# Patient Record
Sex: FEMALE | Race: BLACK OR AFRICAN AMERICAN | Employment: OTHER | URBAN - METROPOLITAN AREA
[De-identification: names, ages, dates, MRNs, and addresses within clinical notes are randomized per-mention and may not be internally consistent; named-entity substitution may affect disease eponyms.]

---

## 2018-10-02 ENCOUNTER — HOSPITAL ENCOUNTER (EMERGENCY)
Age: 83
Discharge: ANOTHER HOSPITAL | End: 2018-10-02
Attending: EMERGENCY MEDICINE
Payer: MEDICARE

## 2018-10-02 ENCOUNTER — APPOINTMENT (OUTPATIENT)
Dept: CT IMAGING | Age: 83
End: 2018-10-02
Payer: MEDICARE

## 2018-10-02 ENCOUNTER — APPOINTMENT (OUTPATIENT)
Dept: GENERAL RADIOLOGY | Age: 83
End: 2018-10-02
Payer: MEDICARE

## 2018-10-02 VITALS
HEIGHT: 60 IN | WEIGHT: 134.7 LBS | BODY MASS INDEX: 26.45 KG/M2 | SYSTOLIC BLOOD PRESSURE: 121 MMHG | TEMPERATURE: 97.5 F | HEART RATE: 70 BPM | OXYGEN SATURATION: 98 % | DIASTOLIC BLOOD PRESSURE: 60 MMHG | RESPIRATION RATE: 14 BRPM

## 2018-10-02 DIAGNOSIS — K85.90 ACUTE PANCREATITIS WITHOUT INFECTION OR NECROSIS, UNSPECIFIED PANCREATITIS TYPE: Primary | ICD-10-CM

## 2018-10-02 DIAGNOSIS — R55 SYNCOPE AND COLLAPSE: ICD-10-CM

## 2018-10-02 LAB
ALBUMIN SERPL-MCNC: 3.9 G/DL (ref 3.4–5)
ALP BLD-CCNC: 121 U/L (ref 40–129)
ALT SERPL-CCNC: 10 U/L (ref 10–40)
ANION GAP SERPL CALCULATED.3IONS-SCNC: 14 MMOL/L (ref 3–16)
AST SERPL-CCNC: 19 U/L (ref 15–37)
BASOPHILS ABSOLUTE: 0 K/UL (ref 0–0.2)
BASOPHILS RELATIVE PERCENT: 0.3 %
BILIRUB SERPL-MCNC: 0.4 MG/DL (ref 0–1)
BILIRUBIN DIRECT: <0.2 MG/DL (ref 0–0.3)
BILIRUBIN, INDIRECT: NORMAL MG/DL (ref 0–1)
BUN BLDV-MCNC: 35 MG/DL (ref 7–20)
CALCIUM SERPL-MCNC: 8.2 MG/DL (ref 8.3–10.6)
CHLORIDE BLD-SCNC: 96 MMOL/L (ref 99–110)
CO2: 30 MMOL/L (ref 21–32)
CREAT SERPL-MCNC: 3.6 MG/DL (ref 0.6–1.2)
EOSINOPHILS ABSOLUTE: 0 K/UL (ref 0–0.6)
EOSINOPHILS RELATIVE PERCENT: 0.5 %
GFR AFRICAN AMERICAN: 15
GFR NON-AFRICAN AMERICAN: 12
GLUCOSE BLD-MCNC: 78 MG/DL (ref 70–99)
HCT VFR BLD CALC: 38.1 % (ref 36–48)
HEMOGLOBIN: 11.5 G/DL (ref 12–16)
LIPASE: 523 U/L (ref 13–60)
LYMPHOCYTES ABSOLUTE: 1.1 K/UL (ref 1–5.1)
LYMPHOCYTES RELATIVE PERCENT: 17.4 %
MCH RBC QN AUTO: 27.4 PG (ref 26–34)
MCHC RBC AUTO-ENTMCNC: 30.2 G/DL (ref 31–36)
MCV RBC AUTO: 90.8 FL (ref 80–100)
MONOCYTES ABSOLUTE: 1.1 K/UL (ref 0–1.3)
MONOCYTES RELATIVE PERCENT: 16.5 %
NEUTROPHILS ABSOLUTE: 4.2 K/UL (ref 1.7–7.7)
NEUTROPHILS RELATIVE PERCENT: 65.3 %
PDW BLD-RTO: 20.5 % (ref 12.4–15.4)
PLATELET # BLD: 129 K/UL (ref 135–450)
PMV BLD AUTO: 7.7 FL (ref 5–10.5)
POTASSIUM SERPL-SCNC: 3.7 MMOL/L (ref 3.5–5.1)
RBC # BLD: 4.2 M/UL (ref 4–5.2)
SODIUM BLD-SCNC: 140 MMOL/L (ref 136–145)
TOTAL PROTEIN: 6.8 G/DL (ref 6.4–8.2)
TROPONIN: 0.1 NG/ML
WBC # BLD: 6.4 K/UL (ref 4–11)

## 2018-10-02 PROCEDURE — 83690 ASSAY OF LIPASE: CPT

## 2018-10-02 PROCEDURE — 84484 ASSAY OF TROPONIN QUANT: CPT

## 2018-10-02 PROCEDURE — 96374 THER/PROPH/DIAG INJ IV PUSH: CPT

## 2018-10-02 PROCEDURE — 93005 ELECTROCARDIOGRAM TRACING: CPT | Performed by: EMERGENCY MEDICINE

## 2018-10-02 PROCEDURE — 85025 COMPLETE CBC W/AUTO DIFF WBC: CPT

## 2018-10-02 PROCEDURE — 96361 HYDRATE IV INFUSION ADD-ON: CPT

## 2018-10-02 PROCEDURE — 71045 X-RAY EXAM CHEST 1 VIEW: CPT

## 2018-10-02 PROCEDURE — 74176 CT ABD & PELVIS W/O CONTRAST: CPT

## 2018-10-02 PROCEDURE — 6360000002 HC RX W HCPCS: Performed by: EMERGENCY MEDICINE

## 2018-10-02 PROCEDURE — 80076 HEPATIC FUNCTION PANEL: CPT

## 2018-10-02 PROCEDURE — 80048 BASIC METABOLIC PNL TOTAL CA: CPT

## 2018-10-02 PROCEDURE — 99285 EMERGENCY DEPT VISIT HI MDM: CPT

## 2018-10-02 PROCEDURE — 2580000003 HC RX 258: Performed by: EMERGENCY MEDICINE

## 2018-10-02 RX ORDER — MELOXICAM 7.5 MG/1
7.5 TABLET ORAL DAILY
COMMUNITY

## 2018-10-02 RX ORDER — DRONABINOL 5 MG/1
5 CAPSULE ORAL
COMMUNITY

## 2018-10-02 RX ORDER — ONDANSETRON 2 MG/ML
4 INJECTION INTRAMUSCULAR; INTRAVENOUS EVERY 30 MIN PRN
Status: DISCONTINUED | OUTPATIENT
Start: 2018-10-02 | End: 2018-10-02 | Stop reason: HOSPADM

## 2018-10-02 RX ORDER — FLUDROCORTISONE ACETATE 0.1 MG/1
0.1 TABLET ORAL 2 TIMES DAILY
COMMUNITY

## 2018-10-02 RX ORDER — 0.9 % SODIUM CHLORIDE 0.9 %
500 INTRAVENOUS SOLUTION INTRAVENOUS ONCE
Status: COMPLETED | OUTPATIENT
Start: 2018-10-02 | End: 2018-10-02

## 2018-10-02 RX ORDER — DONEPEZIL HYDROCHLORIDE 10 MG/1
10 TABLET, FILM COATED ORAL NIGHTLY
COMMUNITY

## 2018-10-02 RX ORDER — PREDNISONE 20 MG/1
80 TABLET ORAL DAILY
COMMUNITY
Start: 2018-10-01 | End: 2018-10-02

## 2018-10-02 RX ORDER — POTASSIUM CHLORIDE 20 MEQ/1
20 TABLET, EXTENDED RELEASE ORAL
COMMUNITY

## 2018-10-02 RX ORDER — LOPERAMIDE HYDROCHLORIDE 2 MG/1
2 CAPSULE ORAL 3 TIMES DAILY
COMMUNITY

## 2018-10-02 RX ADMIN — SODIUM CHLORIDE 500 ML: 9 INJECTION, SOLUTION INTRAVENOUS at 15:06

## 2018-10-02 RX ADMIN — HYDROCORTISONE SODIUM SUCCINATE 100 MG: 100 INJECTION, POWDER, FOR SOLUTION INTRAMUSCULAR; INTRAVENOUS at 15:14

## 2018-10-02 NOTE — ED PROVIDER NOTES
[address] [facility] RESIDENT NOTE       Date of evaluation: 10/2/2018    Chief Complaint     Loss of Consciousness (syncopal episode while in dialysis) and Dizziness      History of Present Illness     Tabby Moreno is a 82 y.o. female who With past medical history of end-stage renal disease on dialysis, hypertension, diabetes and other medical problems as outlined below presents to the emergency department from dialysis with syncopal episode. Patient was on the third of 4 hours of her dialysis treatment when she went to go to the restroom. Upon returning to her seat she sat down in the chair and shortly thereafter began feeling lightheaded. She syncopized while in the chair, but did not fall and did not incur any injuries. Prior to this syncopal episode she did not experience any palpitations, chest pain, dyspnea, or other specific symptoms. She does endorse diffuse abdominal pain and chronic diarrhea that is at baseline. She does not make urine and so does not complain of dysuria or hematuria. She has had no bloody stools or melena. She also denies fevers. Review of Systems     Review of Systems    10-point ROS is otherwise negative    Past Medical, Surgical, Family, and Social History     She has a past medical history of Abscess; Abscess; Anemia; Back pain; Chronic kidney disease; Clostridium difficile infection; COPD (chronic obstructive pulmonary disease) ([address] Dementia; Depression; DM2 (diabetes mellitus, type 2) ([address] ESRD (end stage renal disease) ([address] Hypertension; Hypothyroidism; Obesity hypoventilation syndrome ([address] MELISSA (obstructive sleep apnea); Other activity(E029.9); Oxygen dependent; Parkinsonism ([address] Pseudotumor cerebri; Restrictive lung disease; and Spinal stenosis. She has a past surgical history that includes Hysterectomy; Gastric bypass surgery; Cholecystectomy; and hernia repair.   Her family history includes Diabetes in an

## 2018-10-02 NOTE — ED TRIAGE NOTES
Pt arrived to ED via squad for syncopal episode during dialysis today. Received 3/4 hours of treatment. Pt still feeling lightheaded. Denies chest pain. Also complaining of upset stomach.

## 2018-10-02 NOTE — ED NOTES
Pt resting in bed. Pt and family updated on POC. Call light in reach will continue to monitor.       Mukesh Seaman RN  10/02/18 9117

## 2018-10-17 LAB
EKG ATRIAL RATE: 69 BPM
EKG DIAGNOSIS: NORMAL
EKG P AXIS: 39 DEGREES
EKG P-R INTERVAL: 172 MS
EKG Q-T INTERVAL: 434 MS
EKG QRS DURATION: 86 MS
EKG QTC CALCULATION (BAZETT): 465 MS
EKG R AXIS: 1 DEGREES
EKG T AXIS: 4 DEGREES
EKG VENTRICULAR RATE: 69 BPM

## 2020-11-19 ENCOUNTER — APPOINTMENT (OUTPATIENT)
Dept: CT IMAGING | Age: 85
DRG: 189 | End: 2020-11-19
Payer: MEDICARE

## 2020-11-19 ENCOUNTER — APPOINTMENT (OUTPATIENT)
Dept: GENERAL RADIOLOGY | Age: 85
DRG: 189 | End: 2020-11-19
Payer: MEDICARE

## 2020-11-19 ENCOUNTER — HOSPITAL ENCOUNTER (INPATIENT)
Age: 85
LOS: 5 days | Discharge: NURSING, REHAB OR LONG-TERM CARE | DRG: 189 | End: 2020-11-24
Attending: EMERGENCY MEDICINE | Admitting: INTERNAL MEDICINE
Payer: MEDICARE

## 2020-11-19 PROBLEM — J44.1 COPD EXACERBATION (HCC): Status: ACTIVE | Noted: 2020-11-19

## 2020-11-19 LAB
ALBUMIN SERPL-MCNC: 3.8 G/DL (ref 3.4–5)
ALP BLD-CCNC: 109 U/L (ref 40–129)
ALT SERPL-CCNC: 9 U/L (ref 10–40)
ANION GAP SERPL CALCULATED.3IONS-SCNC: 18 MMOL/L (ref 3–16)
AST SERPL-CCNC: 19 U/L (ref 15–37)
BASE EXCESS ARTERIAL: -11.9 MMOL/L (ref -3–3)
BASE EXCESS ARTERIAL: -13 MMOL/L (ref -3–3)
BASE EXCESS VENOUS: -12.2 MMOL/L (ref -2–3)
BASOPHILS ABSOLUTE: 0 K/UL (ref 0–0.2)
BASOPHILS RELATIVE PERCENT: 0.3 %
BILIRUB SERPL-MCNC: 0.4 MG/DL (ref 0–1)
BILIRUBIN DIRECT: <0.2 MG/DL (ref 0–0.3)
BILIRUBIN, INDIRECT: ABNORMAL MG/DL (ref 0–1)
BUN BLDV-MCNC: 84 MG/DL (ref 7–20)
CALCIUM SERPL-MCNC: 7.7 MG/DL (ref 8.3–10.6)
CARBOXYHEMOGLOBIN ARTERIAL: 0 % (ref 0–1.5)
CARBOXYHEMOGLOBIN ARTERIAL: 0.8 % (ref 0–1.5)
CARBOXYHEMOGLOBIN: 0.1 % (ref 0–1.5)
CHLORIDE BLD-SCNC: 101 MMOL/L (ref 99–110)
CO2: 17 MMOL/L (ref 21–32)
CREAT SERPL-MCNC: 7.6 MG/DL (ref 0.6–1.2)
EKG ATRIAL RATE: 70 BPM
EKG DIAGNOSIS: NORMAL
EKG P AXIS: 52 DEGREES
EKG P-R INTERVAL: 164 MS
EKG Q-T INTERVAL: 438 MS
EKG QRS DURATION: 98 MS
EKG QTC CALCULATION (BAZETT): 473 MS
EKG R AXIS: 17 DEGREES
EKG T AXIS: 32 DEGREES
EKG VENTRICULAR RATE: 70 BPM
EOSINOPHILS ABSOLUTE: 0 K/UL (ref 0–0.6)
EOSINOPHILS RELATIVE PERCENT: 0.2 %
GFR AFRICAN AMERICAN: 6
GFR NON-AFRICAN AMERICAN: 5
GLUCOSE BLD-MCNC: 86 MG/DL (ref 70–99)
GLUCOSE BLD-MCNC: 97 MG/DL (ref 70–99)
HCO3 ARTERIAL: 18 MMOL/L (ref 21–29)
HCO3 ARTERIAL: 21 MMOL/L (ref 21–29)
HCO3 VENOUS: 19.3 MMOL/L (ref 24–28)
HCT VFR BLD CALC: 39.6 % (ref 36–48)
HEMOGLOBIN, ART, EXTENDED: 11.1 G/DL
HEMOGLOBIN, ART, EXTENDED: 11.4 G/DL
HEMOGLOBIN: 11.3 G/DL (ref 12–16)
LACTIC ACID: 0.9 MMOL/L (ref 0.4–2)
LYMPHOCYTES ABSOLUTE: 1.9 K/UL (ref 1–5.1)
LYMPHOCYTES RELATIVE PERCENT: 15.5 %
MAGNESIUM: 1.7 MG/DL (ref 1.8–2.4)
MCH RBC QN AUTO: 29.6 PG (ref 26–34)
MCHC RBC AUTO-ENTMCNC: 28.6 G/DL (ref 31–36)
MCV RBC AUTO: 103.2 FL (ref 80–100)
METHEMOGLOBIN ARTERIAL: 3.1 % (ref 0–1.4)
METHEMOGLOBIN ARTERIAL: 3.6 % (ref 0–1.4)
METHEMOGLOBIN VENOUS: 2.6 % (ref 0–1.5)
MONOCYTES ABSOLUTE: 1.5 K/UL (ref 0–1.3)
MONOCYTES RELATIVE PERCENT: 12.1 %
NEUTROPHILS ABSOLUTE: 8.7 K/UL (ref 1.7–7.7)
NEUTROPHILS RELATIVE PERCENT: 71.9 %
O2 SAT, ARTERIAL: 95 % (ref 93–100)
O2 SAT, ARTERIAL: 96 % (ref 93–100)
O2 SAT, VEN: 77 %
PCO2 ARTERIAL: 71.2 MMHG (ref 35–45)
PCO2 ARTERIAL: 87.5 MMHG (ref 35–45)
PCO2, VEN: 73 MMHG (ref 41–51)
PDW BLD-RTO: 17.2 % (ref 12.4–15.4)
PERFORMED ON: NORMAL
PH ARTERIAL: 6.98 (ref 7.35–7.45)
PH ARTERIAL: 7.04 (ref 7.35–7.45)
PH VENOUS: 7.03 (ref 7.35–7.45)
PHOSPHORUS: 8.3 MG/DL (ref 2.5–4.9)
PLATELET # BLD: 89 K/UL (ref 135–450)
PLATELET SLIDE REVIEW: ABNORMAL
PMV BLD AUTO: 9.5 FL (ref 5–10.5)
PO2 ARTERIAL: 165 MMHG (ref 75–108)
PO2 ARTERIAL: 207 MMHG (ref 75–108)
PO2, VEN: 62.5 MMHG (ref 25–40)
POTASSIUM SERPL-SCNC: 5 MMOL/L (ref 3.5–5.1)
RBC # BLD: 3.83 M/UL (ref 4–5.2)
SLIDE REVIEW: ABNORMAL
SODIUM BLD-SCNC: 136 MMOL/L (ref 136–145)
TCO2 ARTERIAL: 21 MMOL/L
TCO2 ARTERIAL: 88 MMOL/L
TCO2 CALC VENOUS: 73 MMOL/L
TOTAL PROTEIN: 6.7 G/DL (ref 6.4–8.2)
TROPONIN: 0.08 NG/ML
WBC # BLD: 12.1 K/UL (ref 4–11)

## 2020-11-19 PROCEDURE — 94761 N-INVAS EAR/PLS OXIMETRY MLT: CPT

## 2020-11-19 PROCEDURE — U0003 INFECTIOUS AGENT DETECTION BY NUCLEIC ACID (DNA OR RNA); SEVERE ACUTE RESPIRATORY SYNDROME CORONAVIRUS 2 (SARS-COV-2) (CORONAVIRUS DISEASE [COVID-19]), AMPLIFIED PROBE TECHNIQUE, MAKING USE OF HIGH THROUGHPUT TECHNOLOGIES AS DESCRIBED BY CMS-2020-01-R: HCPCS

## 2020-11-19 PROCEDURE — 71045 X-RAY EXAM CHEST 1 VIEW: CPT

## 2020-11-19 PROCEDURE — 80076 HEPATIC FUNCTION PANEL: CPT

## 2020-11-19 PROCEDURE — 93005 ELECTROCARDIOGRAM TRACING: CPT | Performed by: EMERGENCY MEDICINE

## 2020-11-19 PROCEDURE — 6360000002 HC RX W HCPCS: Performed by: EMERGENCY MEDICINE

## 2020-11-19 PROCEDURE — 37799 UNLISTED PX VASCULAR SURGERY: CPT

## 2020-11-19 PROCEDURE — 94660 CPAP INITIATION&MGMT: CPT

## 2020-11-19 PROCEDURE — 83605 ASSAY OF LACTIC ACID: CPT

## 2020-11-19 PROCEDURE — 70450 CT HEAD/BRAIN W/O DYE: CPT

## 2020-11-19 PROCEDURE — 84484 ASSAY OF TROPONIN QUANT: CPT

## 2020-11-19 PROCEDURE — 2580000003 HC RX 258: Performed by: EMERGENCY MEDICINE

## 2020-11-19 PROCEDURE — 2000000000 HC ICU R&B

## 2020-11-19 PROCEDURE — 82803 BLOOD GASES ANY COMBINATION: CPT

## 2020-11-19 PROCEDURE — 36600 WITHDRAWAL OF ARTERIAL BLOOD: CPT

## 2020-11-19 PROCEDURE — 99284 EMERGENCY DEPT VISIT MOD MDM: CPT

## 2020-11-19 PROCEDURE — 84100 ASSAY OF PHOSPHORUS: CPT

## 2020-11-19 PROCEDURE — 36415 COLL VENOUS BLD VENIPUNCTURE: CPT

## 2020-11-19 PROCEDURE — 80048 BASIC METABOLIC PNL TOTAL CA: CPT

## 2020-11-19 PROCEDURE — 2700000000 HC OXYGEN THERAPY PER DAY

## 2020-11-19 PROCEDURE — 87040 BLOOD CULTURE FOR BACTERIA: CPT

## 2020-11-19 PROCEDURE — 83735 ASSAY OF MAGNESIUM: CPT

## 2020-11-19 PROCEDURE — 85025 COMPLETE CBC W/AUTO DIFF WBC: CPT

## 2020-11-19 RX ORDER — 0.9 % SODIUM CHLORIDE 0.9 %
500 INTRAVENOUS SOLUTION INTRAVENOUS ONCE
Status: COMPLETED | OUTPATIENT
Start: 2020-11-19 | End: 2020-11-19

## 2020-11-19 RX ORDER — MELOXICAM 7.5 MG/1
7.5 TABLET ORAL DAILY
Status: CANCELLED | OUTPATIENT
Start: 2020-11-19

## 2020-11-19 RX ADMIN — VANCOMYCIN HYDROCHLORIDE 1250 MG: 10 INJECTION, POWDER, LYOPHILIZED, FOR SOLUTION INTRAVENOUS at 21:54

## 2020-11-19 RX ADMIN — CEFEPIME 2 G: 2 INJECTION, POWDER, FOR SOLUTION INTRAMUSCULAR; INTRAVENOUS at 18:06

## 2020-11-19 RX ADMIN — SODIUM CHLORIDE 500 ML: 9 INJECTION, SOLUTION INTRAVENOUS at 15:20

## 2020-11-19 ASSESSMENT — ENCOUNTER SYMPTOMS
EYE DISCHARGE: 0
BACK PAIN: 0
ABDOMINAL PAIN: 0
VOMITING: 0
CHOKING: 0
NAUSEA: 0
SORE THROAT: 0
SHORTNESS OF BREATH: 0

## 2020-11-19 NOTE — ED TRIAGE NOTES
Pt arrived to ED with hypotension and lethary that started during dialysis treatment today. Per EMS BP went down to 80/40. Only received 1 hour of treatment. Denies recent illness. Pt lethargic and falling alseep between triage questions. Easily arousable, oriented x4.

## 2020-11-19 NOTE — H&P
Internal Medicine  PGY 1  History & Physical      CC Fatigue, Confusion    History Obtained From:  patient    HISTORY OF PRESENT ILLNESS:  Cherelle Martin is a 82 y.o. female with a PMHx of COPD, adrenal insufficiency, CAD, MELISSA who presented with confusion with fatigue. She states that today she began to feel confused and fatigued. She originally went to [facility] ED where she was told to go to outpatient dialysis. During dialysis she had altered status and hypotension with a systolic in the 41F and was becoming more somnolent and sent to the [facility] ED. When I saw the patient she was awake and alert. She say she has felt tired and confused today but was alert and oriented x3. She denied any fever, chills, cough, chest pain, SOB, abdominal pain, nausea, vomitting, or diarrhea. In the ED lab work was afebrile, normal RR, not tachycardic, and hypotensive to 77/34 and received 500 ml of fluid. Labs showed K of 5, Cr 7.6, AG 18, Mg 1.7, BUN 84, phos 8.3, glucose 97, troponin .08, WBC 12.1, HG 11.3, and ABG showed respiratory acidosis with pH 6.98, pCO2 87.5. She was started on BiPAP in the ED. CXR showed expiratory lungs with no gross evidence for acute disease.      Past Medical History:        Diagnosis Date    Abscess     Abscess     Anemia     Back pain     Chronic kidney disease     Clostridium difficile infection 6/7/15;3/15/15; 3/24/15    6/7 nursing home report positive; PCR    [facility]     baseline O2 is 2L / min    Dementia ([facility])     Depression     DM2 (di[facility] mellitus, type 2) [facility]     ESRD (end stage renal disease) ([facility]     HD on Tu/Th/ Sa    Hypertension     Hypothyroidism     Obesity hypoventilation syndrome (HCC)     MELISSA (obstructive sleep apnea)     Other activity(E029.9)     chronic loose bms     Oxygen dependent     2 LPM    Parkinsonism ([facility]     Pseudotumor cerebri     Restrictive lung disease     Spinal stenosis Past Surgical History:        Procedure Laterality Date    CHOLECYSTECTOMY      GASTRIC BYPASS SURGERY      HERNIA REPAIR      HYSTERECTOMY         Medications Priorto Admission:    Not in a hospital admission. Allergies:  Decadron [dexamethasone]; Dye [iodides]; Pcn [penicillins]; Percocet [oxycodone-acetaminophen]; and Vicodin [hydrocodone-acetaminophen]    Social History:   · TOBACCO:   reports that she has quit smoking. She has never used smokeless tobacco.  · ETOH:   reports no history of alcohol use. · DRUGS : None      Family History:       Problem Relation Age of Onset    Lung Cancer Sister     Diabetes Other     Hypertension Other        Review of Systems   Constitutional: Positive for fatigue. Negative for chills and fever. HENT: Negative for congestion and sore throat. Eyes: Negative for discharge. Respiratory: Negative for choking and shortness of breath. Cardiovascular: Negative for chest pain and leg swelling. Gastrointestinal: Negative for abdominal pain, nausea and vomiting. Genitourinary: Positive for enuresis. Musculoskeletal: Negative for back pain. Neurological: Negative for dizziness, weakness and numbness. Physical exam:       Vitals:    11/19/20 1736   BP:    Pulse:    Resp: 15   Temp:    SpO2:        Physical Exam  Constitutional:       General: She is not in acute distress. Appearance: She is ill-appearing. HENT:      Head: Normocephalic and atraumatic. Right Ear: External ear normal.      Left Ear: External ear normal.      Mouth/Throat:      Comments: BiPAP in place  Eyes:      Extraocular Movements: Extraocular movements intact. Pupils: Pupils are equal, round, and reactive to light. Cardiovascular:      Rate and Rhythm: Normal rate and regular rhythm. Pulses: Normal pulses. Heart sounds: No murmur. No friction rub. No gallop. Pulmonary:      Breath sounds: Wheezing (Bilaterally, expiratory) present.    Abdominal: General: There is no distension. Palpations: Abdomen is soft. Tenderness: There is no abdominal tenderness. Musculoskeletal:         General: No swelling. Right lower leg: No edema. Left lower leg: No edema. Skin:     General: Skin is warm and dry. Neurological:      Mental Status: She is alert and oriented to person, place, and time. Cranial Nerves: No cranial nerve deficit. Motor: Weakness present. DATA:    Labs:  CBC:   Recent Labs     11/19/20  1540   WBC 12.1*   HGB 11.3*   HCT 39.6   PLT 89*       BMP:   Recent Labs     11/19/20  1540      K 5.0      CO2 17*   BUN 84*   CREATININE 7.6*   GLUCOSE 97   PHOS 8.3*     LFT's: No results for input(s): AST, ALT, ALB, BILITOT, ALKPHOS in the last 72 hours. Troponin:   Recent Labs     11/19/20  1540   TROPONINI 0.08*     BNP:No results for input(s): BNP in the last 72 hours. ABGs:   Recent Labs     11/19/20  1706   PHART 6.980*   OTJ2XPQ 87.5*   PO2ART 207.0*     INR: No results for input(s): INR in the last 72 hours. U/A:No results for input(s): NITRITE, COLORU, PHUR, LABCAST, WBCUA, RBCUA, MUCUS, TRICHOMONAS, YEAST, BACTERIA, CLARITYU, SPECGRAV, LEUKOCYTESUR, UROBILINOGEN, BILIRUBINUR, BLOODU, GLUCOSEU, AMORPHOUS in the last 72 hours. Invalid input(s): Phil Donald    Micro:    Imaging:  CT HEAD WO CONTRAST   Final Result      No acute intracranial abnormality or significant mass effect. XR CHEST PORTABLE   Final Result      Expiratory lungs with no gross evidence for acute cardiopulmonary disease              ASSESSMENT AND PLAN:  Sunita Ryan is a 82 y.o. female with a PMHx of COPD, adrenal insufficiency, CAD, MELISSA who presented with confusion with fatigue. Acute Hypercapnic Respiratory Failure  Patient somnolent on presentation with respiratory acidosis. Patient was started on BiPAP in the ED. When I saw her she was fatigued but alert and oriented.  History of COPD with wheezing on exam, likely due to COPD exacerbation. Given dose of Cefepime and vancomycin in the ED. Low suspicion for pneumonia without fever, tachycardia, and clean CXR but patient does have leukocytosis. - Continue BiPAP  - Repeat blood gas at 1930  - Azythromycin (11/19-)  - DuoNebs q 6 hrs  - Procalcitonin pending  - COVID-19 pending    Hypotension  Patient with hypotension in Dialysis and initial BP of 77/34 in the ED  Received 500 mL in the ED. BP improved with 500ml to 104/80. Could be 2/2 to adrenal insufficiency or dialysis earlier in the day. No concern for sepsis, lactate normal.   - Corticotropin test  - Continue to monitor in the intensive care unit, if does not improve consider 500 mL bolus    Adrenal Insufficiency   History of adrenal insufficiency on fludrocortisone. Possibly contributing to hypotension but not on hydrocortizone at home. - Corticotropin test  - Continue home florinef    Troponinemia  ECG with no evidence of ischemia. Likely demand complicated by ESRD. - Trend troponins     ESRD on Dialysis  No acute indication for emergent dialysis. - Nephrology consulted    DM Type 2  Not on any medications at home. - Hypoglycemia protocol     Alzheimer  Continue Donezapil    Will discuss with attending physician     Code Status:CNR CCA  FEN: NPO  PPX: SQH  DISPO: Elizabeth Shearer MD  11/19/2020,  6:55 PM    I have personally seen and evaluated this patient. I discussed findings and management with the resident,  on 11/19/2020  and agree as documented in this note. Total critical care time spent not including procedures 35 min. Renal panel repeat with elevated K 6.3, but hemolyzed sample, recheck K stat, and if elevated give calcium gluconate/hyper K cocktail along with Lokelma. She continues to be hypercarbic and hypotensive, will start steroids for COPD exacerbation and adrenal insufficiency.      [facility]  Attending Physician

## 2020-11-19 NOTE — ED PROVIDER NOTES
[address] [place]          ATTENDING PHYSICIAN NOTE       Date of evaluation: 11/19/2020    Chief Complaint     Hypotension and Fatigue      History of Present Illness     Tien Nuno is a 82 y.o. female who presents to the emergency department with altered mental status and hypotension from dialysis. The patient was at [facility] emergency department earlier in the day for complaints of confusion and had a lab and radiographic evaluation there which showed some progressed bilateral airspace disease a small left pleural effusion as well as some mild electrolyte abnormalities. The patient was discharged and transferred to dialysis. In dialysis the patient became hypotensive with systolic pressure blood pressures in the 70s. Review of the patient's records shows that her previous baseline blood pressures appear to be in the 1 teens. Upon seeing this hypotension and noting that the patient was increasingly somnolent they elected to transfer the patient to us for further evaluation. The patient does not express any pain related complaints at this time. Denies any significant shortness of breath or abdominal pain. Denies chest pain. Review of Systems     As documented in the HPI, otherwise all other systems were reviewed and were negative. Past Medical, Surgical, Family, and Social History     She has a past medical history of Abscess, Abscess, Anemia, Back pain, Chronic kidney disease, Clostridium difficile infection, COPD (chronic obstructive pulmonary disease) ([address] Dementia ([address] Depression, DM2 (diabetes mellitus, type 2) ([address] ESRD (end stage renal disease) ([address] Hypertension, Hypothyroidism, Obesity hypoventilation syndrome ([address] MELISSA (obstructive sleep apnea), Other activity(E029.9), Oxygen dependent, Parkinsonism ([address] Pseudotumor cerebri, Restrictive lung disease, and Spinal stenosis.   She has a past surgical history that includes Hysterectomy; Gastric bypass surgery; Cholecystectomy; and hernia repair. Her family history includes Diabetes in an other family member; Hypertension in an other family member; Gia Mendoza in her sister. She reports that she has quit smoking. She has never used smokeless tobacco. She reports that she does not drink alcohol or use drugs. Medications     Previous Medications    ATORVASTATIN (LIPITOR) 10 MG TABLET    Take 10 mg by mouth nightly. BUDESONIDE-FORMOTEROL (SYMBICORT) 160-4.5 MCG/ACT AERO    Inhale 1 puff into the lungs 2 times daily    CALCITRIOL (ROCALTROL) 0.25 MCG CAPSULE    Take 1 capsule by mouth every other day    CITALOPRAM (CELEXA) 10 MG TABLET    Take 10 mg by mouth nightly     DONEPEZIL (ARICEPT) 10 MG TABLET    Take 10 mg by mouth nightly    DRONABINOL (MARINOL) 5 MG CAPSULE    Take 5 mg by mouth 2 times daily (before meals). Emory Mathis FLUDROCORTISONE (FLORINEF) 0.1 MG TABLET    Take 0.1 mg by mouth 2 times daily    LEVOTHYROXINE (SYNTHROID) 100 MCG TABLET    Take 100 mcg by mouth Daily. LOPERAMIDE (IMODIUM) 2 MG CAPSULE    Take 2 mg by mouth 3 times daily    MELOXICAM (MOBIC) 7.5 MG TABLET    Take 7.5 mg by mouth daily    METHYLCELLULOSE (CITRUCEL) ORAL POWDER    Take 2 g by mouth 2 times daily Take by mouth daily. POTASSIUM CHLORIDE (KLOR-CON M) 20 MEQ EXTENDED RELEASE TABLET    Take 20 mEq by mouth four times a week Take on Non-dialysis days (which is Sun-Mon-Wed-Fri)       Allergies     She is allergic to decadron [dexamethasone]; dye [iodides]; pcn [penicillins]; percocet [oxycodone-acetaminophen]; and vicodin [hydrocodone-acetaminophen].     Physical Exam     INITIAL VITALS: BP: (!) 77/34, Temp: 97.5 °F (36.4 °C), Pulse: 67, Resp: 14, SpO2: 90 %   General: 22-year-old elderly female lying in bed with no significant cardiorespiratory distress  HEENT:  head is atraumatic, pupils equal round and reactive to light, sclera are clear, oropharynx is nonerythematous  Neck: supple, no lymphadenopathy  Chest: nonlabored respirations, equal chest rise bilaterally, no accessory muscle use  Cardiovascular: Regular, rate, and rhythm, 2+ radial pulses bilaterally, capillary refill 2 seconds  Abdominal: Soft, nontender, nondistended, positive bowel sounds throughout, no rebound or guarding  Skin: Warm, dry well perfused, no rashes  Musculoskeletal: no obvious deformities, no tenderness to palpation diffusely  Neurologic:  alert and oriented x4, speech is clear and intact without dysarthria, moves all 4 extremities equally    Diagnostic Results     EKG   Normal sinus rhythm no ST or T wave changes that would be indicative of active ischemia normal axis normal intervals    RADIOLOGY:  CT HEAD WO CONTRAST   Final Result      No acute intracranial abnormality or significant mass effect.       XR CHEST PORTABLE   Final Result      Expiratory lungs with no gross evidence for acute cardiopulmonary disease          LABS:   Results for orders placed or performed during the hospital encounter of 11/19/20   CBC Auto Differential   Result Value Ref Range    WBC 12.1 (H) 4.0 - 11.0 K/uL    RBC 3.83 (L) 4.00 - 5.20 M/uL    Hemoglobin 11.3 (L) 12.0 - 16.0 g/dL    Hematocrit 39.6 36.0 - 48.0 %    .2 (H) 80.0 - 100.0 fL    MCH 29.6 26.0 - 34.0 pg    MCHC 28.6 (L) 31.0 - 36.0 g/dL    RDW 17.2 (H) 12.4 - 15.4 %    Platelets 89 (L) 928 - 450 K/uL    MPV 9.5 5.0 - 10.5 fL    PLATELET SLIDE REVIEW Decreased     SLIDE REVIEW see below     Neutrophils % 71.9 %    Lymphocytes % 15.5 %    Monocytes % 12.1 %    Eosinophils % 0.2 %    Basophils % 0.3 %    Neutrophils Absolute 8.7 (H) 1.7 - 7.7 K/uL    Lymphocytes Absolute 1.9 1.0 - 5.1 K/uL    Monocytes Absolute 1.5 (H) 0.0 - 1.3 K/uL    Eosinophils Absolute 0.0 0.0 - 0.6 K/uL    Basophils Absolute 0.0 0.0 - 0.2 K/uL   Basic Metabolic Panel   Result Value Ref Range    Sodium 136 136 - 145 mmol/L    Potassium 5.0 3.5 - 5.1 mmol/L    Chloride 101 99 - 110 mmol/L    CO2 17 (L) 21 - 32 mmol/L    Anion Gap 18 (H) 3 - 16    Glucose 97 70 - 99 mg/dL    BUN 84 (HH) 7 - 20 mg/dL    CREATININE 7.6 (HH) 0.6 - 1.2 mg/dL    GFR Non- 5 (A) >60    GFR  6 (A) >60    Calcium 7.7 (L) 8.3 - 10.6 mg/dL   Phosphorus   Result Value Ref Range    Phosphorus 8.3 (H) 2.5 - 4.9 mg/dL   Magnesium   Result Value Ref Range    Magnesium 1.70 (L) 1.80 - 2.40 mg/dL   Blood Gas, Venous   Result Value Ref Range    pH, Brandon 7.031 (LL) 7.350 - 7.450    pCO2, Brandon 73.0 (H) 41.0 - 51.0 mmHg    pO2, Brandon 62.5 (H) 25.0 - 40.0 mmHg    HCO3, Venous 19.3 (L) 24.0 - 28.0 mmol/L    Base Excess, Brandon -12.2 (L) -2.0 - 3.0 mmol/L    O2 Sat, Brandon 77 Not established %    Carboxyhemoglobin 0.1 0.0 - 1.5 %    MetHgb, Brandon 2.6 (H) 0.0 - 1.5 %    TC02 (Calc), Brandon 73 mmol/L   Lactic Acid, Plasma   Result Value Ref Range    Lactic Acid 0.9 0.4 - 2.0 mmol/L   Troponin   Result Value Ref Range    Troponin 0.08 (H) <0.01 ng/mL   Blood gas, arterial (Lab)   Result Value Ref Range    pH, Arterial 6.980 (LL) 7.350 - 7.450    pCO2, Arterial 87.5 (HH) 35.0 - 45.0 mmHg    pO2, Arterial 207.0 (H) 75.0 - 108.0 mmHg    HCO3, Arterial 21 21 - 29 mmol/L    Base Excess, Arterial -11.9 (L) -3.0 - 3.0 mmol/L    Hemoglobin, Art, Extended 11.10 g/dL    O2 Sat, Arterial 96 93 - 100 %    Carboxyhgb, Arterial 0.0 0.0 - 1.5 %    Methemoglobin, Arterial 3.1 (H) 0.0 - 1.4 %    TCO2, Arterial 88 mmol/L   POCT Glucose   Result Value Ref Range    POC Glucose 86 70 - 99 mg/dl    Performed on ACCU-CHEK        RECENT VITALS:  BP: (!) 81/34, Temp: 97.5 °F (36.4 °C), Pulse: 66, Resp: 15, SpO2: 100 %       ED Course     Nursing Notes, Past Medical Hx, Past Surgical Hx, Social Hx, Allergies, and Family Hx were reviewed.     The patient was given the following medications:  Orders Placed This Encounter   Medications    0.9 % sodium chloride bolus    vancomycin (VANCOCIN) 1,250 mg in dextrose 5 % 250 mL IVPB     Order Specific Question: Antimicrobial Indications     Answer:   Sepsis of Unknown Etiology    cefepime (MAXIPIME) 2 g IVPB minibag     Order Specific Question:   Antimicrobial Indications     Answer:   Pneumonia (HAP)     Order Specific Question:   Antimicrobial Indications     Answer:   Sepsis of Unknown Etiology       CONSULTS:  IP CONSULT TO NEPHROLOGY  IP CONSULT TO HOSPITALIST  IP CONSULT TO CRITICAL CARE    MEDICAL DECISION Leesa Garcia / ASSESSMENT / Fabien Mckenzie is a 82 y.o. female who presents emergency department with increasing confusion and somnolence and hypotension that occurred during dialysis earlier today. The patient reports awakening this morning and feeling more unwell than she had before. Otherwise reports no recent illnesses or known sick contacts. On assessment the patient was sleepy but easily arousable to verbal stimulus. She can carry on a brief conversation but then would fall asleep multiple times during the course of the conversation. She otherwise had a nonfocal neurologic exam.  She had laboratory investigations that were sent here today which showed an EKG which was nonischemic. CBC with a white count of 12.1 hemoglobin hematocrit at baseline values her basic metabolic profile had a potassium of 5, sodium of 136 bicarb of 17 anion gap of 18 BUN was 84 creatinine was 7.6 she had initial venous blood gas which showed severe mixed metabolic and respiratory acidosis with a pH of 7.031. Mild elevation of troponin to 0.08. Lactic acid of 0.9. Arterial blood gas was performed prior to initiating BiPAP which showed a pH of 6.98 a PCO2 of 87.5 and a base deficit of 11.9. The chest x-ray shows expiratory film without significant cardiopulmonary disease. I was concerned however given the chest x-ray results from [facility] earlier today that she could have a pneumonia so the patient was empirically started on antibiotics.   She was given a 500 mL bolus of saline over 1 hour while here in the emergency department which resulted in some modest improvement of her blood pressure into the 58C systolic. I spoke with the patient's power of  who stated that the patient would want to have everything done until her heart stopped once her heart stops she would not want any further aggressive interventions. She was started on noninvasive positive pressure ventilation and we are currently monitoring the patient's response to this. We will have a repeat blood gas drawn following 1 to 2 hours of being on BiPAP at see for improvement of respiratory acidosis. I have a call out to the hospitalist as well as intensivist for admission to the ICU. Due to the immediate potential for life-threatening deterioration due to acute respiratory failure with hypoxia and hypercapnia, I spent 35 minutes providing direct bedside critical care. This time is excluding time spent supervising residents and time spent performing separately billed procedures      Clinical Impression     1. Acute respiratory failure with hypoxia and hypercapnia (HCC)    2. Altered mental status, unspecified altered mental status type    3. Pneumonia due to organism        Disposition     PATIENT REFERRED TO:  No follow-up provider specified.     DISCHARGE MEDICATIONS:  New Prescriptions    No medications on file       DISPOSITION Decision To Admit 11/19/2020 05:42:22 PM         Felix James MD  11/19/20 Phi Stewart MD  11/19/20 [id number]

## 2020-11-20 LAB
ANION GAP SERPL CALCULATED.3IONS-SCNC: 13 MMOL/L (ref 3–16)
ANION GAP SERPL CALCULATED.3IONS-SCNC: 17 MMOL/L (ref 3–16)
ANION GAP SERPL CALCULATED.3IONS-SCNC: 20 MMOL/L (ref 3–16)
BASE EXCESS ARTERIAL: -10 (ref -3–3)
BASOPHILS ABSOLUTE: 0.1 K/UL (ref 0–0.2)
BASOPHILS RELATIVE PERCENT: 0.7 %
BUN BLDV-MCNC: 103 MG/DL (ref 7–20)
BUN BLDV-MCNC: 40 MG/DL (ref 7–20)
BUN BLDV-MCNC: 90 MG/DL (ref 7–20)
CALCIUM SERPL-MCNC: 7.8 MG/DL (ref 8.3–10.6)
CALCIUM SERPL-MCNC: 7.8 MG/DL (ref 8.3–10.6)
CALCIUM SERPL-MCNC: 8.1 MG/DL (ref 8.3–10.6)
CHLORIDE BLD-SCNC: 101 MMOL/L (ref 99–110)
CHLORIDE BLD-SCNC: 98 MMOL/L (ref 99–110)
CHLORIDE BLD-SCNC: 99 MMOL/L (ref 99–110)
CO2: 17 MMOL/L (ref 21–32)
CO2: 19 MMOL/L (ref 21–32)
CO2: 22 MMOL/L (ref 21–32)
CORTISOL TOTAL: 17.7 UG/DL
CORTISOL TOTAL: 22.8 UG/DL
CREAT SERPL-MCNC: 4.1 MG/DL (ref 0.6–1.2)
CREAT SERPL-MCNC: 7.7 MG/DL (ref 0.6–1.2)
CREAT SERPL-MCNC: 8 MG/DL (ref 0.6–1.2)
EKG ATRIAL RATE: 62 BPM
EKG DIAGNOSIS: NORMAL
EKG P AXIS: 51 DEGREES
EKG P-R INTERVAL: 170 MS
EKG Q-T INTERVAL: 472 MS
EKG QRS DURATION: 92 MS
EKG QTC CALCULATION (BAZETT): 479 MS
EKG R AXIS: 28 DEGREES
EKG T AXIS: 39 DEGREES
EKG VENTRICULAR RATE: 62 BPM
EOSINOPHILS ABSOLUTE: 0 K/UL (ref 0–0.6)
EOSINOPHILS RELATIVE PERCENT: 0.1 %
GFR AFRICAN AMERICAN: 13
GFR AFRICAN AMERICAN: 6
GFR AFRICAN AMERICAN: 6
GFR NON-AFRICAN AMERICAN: 10
GFR NON-AFRICAN AMERICAN: 5
GFR NON-AFRICAN AMERICAN: 5
GLUCOSE BLD-MCNC: 152 MG/DL (ref 70–99)
GLUCOSE BLD-MCNC: 165 MG/DL (ref 70–99)
GLUCOSE BLD-MCNC: 168 MG/DL (ref 70–99)
GLUCOSE BLD-MCNC: 80 MG/DL (ref 70–99)
HCO3 ARTERIAL: 20.3 MMOL/L (ref 21–29)
HCT VFR BLD CALC: 37.1 % (ref 36–48)
HEMOGLOBIN: 11.1 G/DL (ref 12–16)
LYMPHOCYTES ABSOLUTE: 1 K/UL (ref 1–5.1)
LYMPHOCYTES RELATIVE PERCENT: 9.7 %
MCH RBC QN AUTO: 29.5 PG (ref 26–34)
MCHC RBC AUTO-ENTMCNC: 29.8 G/DL (ref 31–36)
MCV RBC AUTO: 99.1 FL (ref 80–100)
MONOCYTES ABSOLUTE: 0.9 K/UL (ref 0–1.3)
MONOCYTES RELATIVE PERCENT: 8.9 %
NEUTROPHILS ABSOLUTE: 8.3 K/UL (ref 1.7–7.7)
NEUTROPHILS RELATIVE PERCENT: 80.6 %
O2 SAT, ARTERIAL: 99 % (ref 93–100)
PCO2 ARTERIAL: 71.7 MM HG (ref 35–45)
PDW BLD-RTO: 16.2 % (ref 12.4–15.4)
PERFORMED ON: ABNORMAL
PERFORMED ON: ABNORMAL
PH ARTERIAL: 7.06 (ref 7.35–7.45)
PLATELET # BLD: 93 K/UL (ref 135–450)
PMV BLD AUTO: 10.4 FL (ref 5–10.5)
PO2 ARTERIAL: 166.2 MM HG (ref 75–108)
POC POTASSIUM: 5.3 MMOL/L (ref 3.5–5.1)
POC SAMPLE TYPE: ABNORMAL
POTASSIUM REFLEX MAGNESIUM: 3.8 MMOL/L (ref 3.5–5.1)
POTASSIUM REFLEX MAGNESIUM: 6.3 MMOL/L (ref 3.5–5.1)
POTASSIUM REFLEX MAGNESIUM: 6.3 MMOL/L (ref 3.5–5.1)
POTASSIUM SERPL-SCNC: 5.7 MMOL/L (ref 3.5–5.1)
PROCALCITONIN: 0.57 NG/ML (ref 0–0.15)
RBC # BLD: 3.75 M/UL (ref 4–5.2)
REASON FOR REJECTION: NORMAL
REJECTED TEST: NORMAL
SARS-COV-2: NOT DETECTED
SODIUM BLD-SCNC: 135 MMOL/L (ref 136–145)
SODIUM BLD-SCNC: 135 MMOL/L (ref 136–145)
SODIUM BLD-SCNC: 136 MMOL/L (ref 136–145)
TCO2 ARTERIAL: 23 MMOL/L
TROPONIN: 0.08 NG/ML
TROPONIN: 0.1 NG/ML
WBC # BLD: 10.3 K/UL (ref 4–11)

## 2020-11-20 PROCEDURE — 84484 ASSAY OF TROPONIN QUANT: CPT

## 2020-11-20 PROCEDURE — 6360000002 HC RX W HCPCS: Performed by: STUDENT IN AN ORGANIZED HEALTH CARE EDUCATION/TRAINING PROGRAM

## 2020-11-20 PROCEDURE — 82803 BLOOD GASES ANY COMBINATION: CPT

## 2020-11-20 PROCEDURE — 6370000000 HC RX 637 (ALT 250 FOR IP): Performed by: STUDENT IN AN ORGANIZED HEALTH CARE EDUCATION/TRAINING PROGRAM

## 2020-11-20 PROCEDURE — 2000000000 HC ICU R&B

## 2020-11-20 PROCEDURE — 84145 PROCALCITONIN (PCT): CPT

## 2020-11-20 PROCEDURE — 36600 WITHDRAWAL OF ARTERIAL BLOOD: CPT

## 2020-11-20 PROCEDURE — 94660 CPAP INITIATION&MGMT: CPT

## 2020-11-20 PROCEDURE — 6370000000 HC RX 637 (ALT 250 FOR IP): Performed by: INTERNAL MEDICINE

## 2020-11-20 PROCEDURE — 80048 BASIC METABOLIC PNL TOTAL CA: CPT

## 2020-11-20 PROCEDURE — 36415 COLL VENOUS BLD VENIPUNCTURE: CPT

## 2020-11-20 PROCEDURE — 90935 HEMODIALYSIS ONE EVALUATION: CPT

## 2020-11-20 PROCEDURE — 93010 ELECTROCARDIOGRAM REPORT: CPT | Performed by: INTERNAL MEDICINE

## 2020-11-20 PROCEDURE — 5A1D70Z PERFORMANCE OF URINARY FILTRATION, INTERMITTENT, LESS THAN 6 HOURS PER DAY: ICD-10-PCS | Performed by: INTERNAL MEDICINE

## 2020-11-20 PROCEDURE — 85025 COMPLETE CBC W/AUTO DIFF WBC: CPT

## 2020-11-20 PROCEDURE — 94640 AIRWAY INHALATION TREATMENT: CPT

## 2020-11-20 PROCEDURE — 99223 1ST HOSP IP/OBS HIGH 75: CPT | Performed by: INTERNAL MEDICINE

## 2020-11-20 PROCEDURE — 2580000003 HC RX 258: Performed by: STUDENT IN AN ORGANIZED HEALTH CARE EDUCATION/TRAINING PROGRAM

## 2020-11-20 PROCEDURE — 84132 ASSAY OF SERUM POTASSIUM: CPT

## 2020-11-20 PROCEDURE — 82533 TOTAL CORTISOL: CPT

## 2020-11-20 PROCEDURE — 94761 N-INVAS EAR/PLS OXIMETRY MLT: CPT

## 2020-11-20 PROCEDURE — 94664 DEMO&/EVAL PT USE INHALER: CPT

## 2020-11-20 PROCEDURE — 2700000000 HC OXYGEN THERAPY PER DAY

## 2020-11-20 PROCEDURE — 93005 ELECTROCARDIOGRAM TRACING: CPT | Performed by: STUDENT IN AN ORGANIZED HEALTH CARE EDUCATION/TRAINING PROGRAM

## 2020-11-20 RX ORDER — ACETAMINOPHEN 325 MG/1
650 TABLET ORAL EVERY 6 HOURS PRN
Status: DISCONTINUED | OUTPATIENT
Start: 2020-11-20 | End: 2020-11-24 | Stop reason: HOSPADM

## 2020-11-20 RX ORDER — LOPERAMIDE HYDROCHLORIDE 2 MG/1
2 CAPSULE ORAL 3 TIMES DAILY
Status: DISCONTINUED | OUTPATIENT
Start: 2020-11-20 | End: 2020-11-20

## 2020-11-20 RX ORDER — 0.9 % SODIUM CHLORIDE 0.9 %
500 INTRAVENOUS SOLUTION INTRAVENOUS ONCE
Status: COMPLETED | OUTPATIENT
Start: 2020-11-20 | End: 2020-11-20

## 2020-11-20 RX ORDER — POLYETHYLENE GLYCOL 3350 17 G/17G
17 POWDER, FOR SOLUTION ORAL DAILY PRN
Status: DISCONTINUED | OUTPATIENT
Start: 2020-11-20 | End: 2020-11-24 | Stop reason: HOSPADM

## 2020-11-20 RX ORDER — SODIUM CHLORIDE 0.9 % (FLUSH) 0.9 %
10 SYRINGE (ML) INJECTION EVERY 12 HOURS SCHEDULED
Status: DISCONTINUED | OUTPATIENT
Start: 2020-11-20 | End: 2020-11-24 | Stop reason: HOSPADM

## 2020-11-20 RX ORDER — MAGNESIUM SULFATE IN WATER 40 MG/ML
2 INJECTION, SOLUTION INTRAVENOUS ONCE
Status: COMPLETED | OUTPATIENT
Start: 2020-11-20 | End: 2020-11-20

## 2020-11-20 RX ORDER — CALCITRIOL 0.25 UG/1
0.25 CAPSULE, LIQUID FILLED ORAL EVERY OTHER DAY
Status: DISCONTINUED | OUTPATIENT
Start: 2020-11-20 | End: 2020-11-24 | Stop reason: HOSPADM

## 2020-11-20 RX ORDER — DONEPEZIL HYDROCHLORIDE 10 MG/1
10 TABLET, FILM COATED ORAL NIGHTLY
Status: DISCONTINUED | OUTPATIENT
Start: 2020-11-20 | End: 2020-11-24 | Stop reason: HOSPADM

## 2020-11-20 RX ORDER — DEXTROSE MONOHYDRATE 25 G/50ML
25 INJECTION, SOLUTION INTRAVENOUS ONCE
Status: COMPLETED | OUTPATIENT
Start: 2020-11-20 | End: 2020-11-20

## 2020-11-20 RX ORDER — ATORVASTATIN CALCIUM 10 MG/1
10 TABLET, FILM COATED ORAL NIGHTLY
Status: DISCONTINUED | OUTPATIENT
Start: 2020-11-20 | End: 2020-11-24 | Stop reason: HOSPADM

## 2020-11-20 RX ORDER — LEVOTHYROXINE SODIUM 0.1 MG/1
100 TABLET ORAL DAILY
Status: DISCONTINUED | OUTPATIENT
Start: 2020-11-20 | End: 2020-11-24 | Stop reason: HOSPADM

## 2020-11-20 RX ORDER — COSYNTROPIN 0.25 MG/ML
250 INJECTION, POWDER, FOR SOLUTION INTRAMUSCULAR; INTRAVENOUS ONCE
Status: DISCONTINUED | OUTPATIENT
Start: 2020-11-20 | End: 2020-11-20

## 2020-11-20 RX ORDER — CITALOPRAM 10 MG/1
10 TABLET ORAL NIGHTLY
Status: DISCONTINUED | OUTPATIENT
Start: 2020-11-20 | End: 2020-11-24 | Stop reason: HOSPADM

## 2020-11-20 RX ORDER — HEPARIN SODIUM 5000 [USP'U]/ML
5000 INJECTION, SOLUTION INTRAVENOUS; SUBCUTANEOUS EVERY 8 HOURS SCHEDULED
Status: DISCONTINUED | OUTPATIENT
Start: 2020-11-20 | End: 2020-11-24 | Stop reason: HOSPADM

## 2020-11-20 RX ORDER — METHYLPREDNISOLONE SODIUM SUCCINATE 40 MG/ML
40 INJECTION, POWDER, LYOPHILIZED, FOR SOLUTION INTRAMUSCULAR; INTRAVENOUS DAILY
Status: DISCONTINUED | OUTPATIENT
Start: 2020-11-20 | End: 2020-11-21

## 2020-11-20 RX ORDER — DEXTROSE MONOHYDRATE 25 G/50ML
12.5 INJECTION, SOLUTION INTRAVENOUS PRN
Status: DISCONTINUED | OUTPATIENT
Start: 2020-11-20 | End: 2020-11-24 | Stop reason: HOSPADM

## 2020-11-20 RX ORDER — NICOTINE POLACRILEX 4 MG
15 LOZENGE BUCCAL PRN
Status: DISCONTINUED | OUTPATIENT
Start: 2020-11-20 | End: 2020-11-24 | Stop reason: HOSPADM

## 2020-11-20 RX ORDER — BUDESONIDE AND FORMOTEROL FUMARATE DIHYDRATE 160; 4.5 UG/1; UG/1
1 AEROSOL RESPIRATORY (INHALATION) 2 TIMES DAILY
Status: DISCONTINUED | OUTPATIENT
Start: 2020-11-20 | End: 2020-11-21

## 2020-11-20 RX ORDER — IPRATROPIUM BROMIDE AND ALBUTEROL SULFATE 2.5; .5 MG/3ML; MG/3ML
1 SOLUTION RESPIRATORY (INHALATION) PRN
Status: DISCONTINUED | OUTPATIENT
Start: 2020-11-20 | End: 2020-11-24 | Stop reason: HOSPADM

## 2020-11-20 RX ORDER — IPRATROPIUM BROMIDE AND ALBUTEROL SULFATE 2.5; .5 MG/3ML; MG/3ML
1 SOLUTION RESPIRATORY (INHALATION) 4 TIMES DAILY
Status: DISCONTINUED | OUTPATIENT
Start: 2020-11-20 | End: 2020-11-20

## 2020-11-20 RX ORDER — ACETAMINOPHEN 650 MG/1
650 SUPPOSITORY RECTAL EVERY 6 HOURS PRN
Status: DISCONTINUED | OUTPATIENT
Start: 2020-11-20 | End: 2020-11-24 | Stop reason: HOSPADM

## 2020-11-20 RX ORDER — DEXTROSE MONOHYDRATE 50 MG/ML
100 INJECTION, SOLUTION INTRAVENOUS PRN
Status: DISCONTINUED | OUTPATIENT
Start: 2020-11-20 | End: 2020-11-24 | Stop reason: HOSPADM

## 2020-11-20 RX ORDER — SODIUM CHLORIDE 0.9 % (FLUSH) 0.9 %
10 SYRINGE (ML) INJECTION PRN
Status: DISCONTINUED | OUTPATIENT
Start: 2020-11-20 | End: 2020-11-24 | Stop reason: HOSPADM

## 2020-11-20 RX ORDER — PROMETHAZINE HYDROCHLORIDE 25 MG/1
12.5 TABLET ORAL EVERY 6 HOURS PRN
Status: DISCONTINUED | OUTPATIENT
Start: 2020-11-20 | End: 2020-11-24 | Stop reason: HOSPADM

## 2020-11-20 RX ORDER — COSYNTROPIN 0.25 MG/ML
250 INJECTION, POWDER, FOR SOLUTION INTRAMUSCULAR; INTRAVENOUS ONCE
Status: DISCONTINUED | OUTPATIENT
Start: 2020-11-20 | End: 2020-11-24 | Stop reason: HOSPADM

## 2020-11-20 RX ORDER — LOPERAMIDE HYDROCHLORIDE 2 MG/1
2 CAPSULE ORAL 3 TIMES DAILY PRN
Status: DISCONTINUED | OUTPATIENT
Start: 2020-11-20 | End: 2020-11-24 | Stop reason: HOSPADM

## 2020-11-20 RX ORDER — ONDANSETRON 2 MG/ML
4 INJECTION INTRAMUSCULAR; INTRAVENOUS EVERY 6 HOURS PRN
Status: DISCONTINUED | OUTPATIENT
Start: 2020-11-20 | End: 2020-11-24 | Stop reason: HOSPADM

## 2020-11-20 RX ORDER — ALBUTEROL SULFATE 2.5 MG/3ML
2.5 SOLUTION RESPIRATORY (INHALATION) ONCE
Status: COMPLETED | OUTPATIENT
Start: 2020-11-20 | End: 2020-11-20

## 2020-11-20 RX ORDER — FLUDROCORTISONE ACETATE 0.1 MG/1
0.1 TABLET ORAL 2 TIMES DAILY
Status: DISCONTINUED | OUTPATIENT
Start: 2020-11-20 | End: 2020-11-24 | Stop reason: HOSPADM

## 2020-11-20 RX ORDER — IPRATROPIUM BROMIDE AND ALBUTEROL SULFATE 2.5; .5 MG/3ML; MG/3ML
1 SOLUTION RESPIRATORY (INHALATION) 4 TIMES DAILY
Status: DISCONTINUED | OUTPATIENT
Start: 2020-11-20 | End: 2020-11-23

## 2020-11-20 RX ADMIN — CALCIUM GLUCONATE 2 G: 98 INJECTION, SOLUTION INTRAVENOUS at 12:03

## 2020-11-20 RX ADMIN — ALBUTEROL SULFATE 2.5 MG: 2.5 SOLUTION RESPIRATORY (INHALATION) at 01:25

## 2020-11-20 RX ADMIN — METHYLPREDNISOLONE SODIUM SUCCINATE 40 MG: 40 INJECTION, POWDER, FOR SOLUTION INTRAMUSCULAR; INTRAVENOUS at 04:11

## 2020-11-20 RX ADMIN — AZITHROMYCIN MONOHYDRATE 500 MG: 500 INJECTION, POWDER, LYOPHILIZED, FOR SOLUTION INTRAVENOUS at 02:05

## 2020-11-20 RX ADMIN — Medication 10 ML: at 08:30

## 2020-11-20 RX ADMIN — HEPARIN SODIUM 5000 UNITS: 5000 INJECTION INTRAVENOUS; SUBCUTANEOUS at 22:18

## 2020-11-20 RX ADMIN — MAGNESIUM SULFATE HEPTAHYDRATE 2 G: 40 INJECTION, SOLUTION INTRAVENOUS at 01:44

## 2020-11-20 RX ADMIN — IPRATROPIUM BROMIDE AND ALBUTEROL SULFATE 1 AMPULE: .5; 3 SOLUTION RESPIRATORY (INHALATION) at 19:48

## 2020-11-20 RX ADMIN — INSULIN HUMAN 10 UNITS: 100 INJECTION, SOLUTION PARENTERAL at 12:03

## 2020-11-20 RX ADMIN — SODIUM CHLORIDE 500 ML: 9 INJECTION, SOLUTION INTRAVENOUS at 10:09

## 2020-11-20 RX ADMIN — HEPARIN SODIUM 5000 UNITS: 5000 INJECTION INTRAVENOUS; SUBCUTANEOUS at 07:25

## 2020-11-20 RX ADMIN — HEPARIN SODIUM 5000 UNITS: 5000 INJECTION INTRAVENOUS; SUBCUTANEOUS at 13:47

## 2020-11-20 RX ADMIN — Medication 10 ML: at 22:18

## 2020-11-20 RX ADMIN — IPRATROPIUM BROMIDE AND ALBUTEROL SULFATE 1 AMPULE: .5; 3 SOLUTION RESPIRATORY (INHALATION) at 13:15

## 2020-11-20 RX ADMIN — DEXTROSE MONOHYDRATE 25 G: 25 INJECTION, SOLUTION INTRAVENOUS at 12:02

## 2020-11-20 RX ADMIN — IPRATROPIUM BROMIDE AND ALBUTEROL SULFATE 1 AMPULE: .5; 3 SOLUTION RESPIRATORY (INHALATION) at 08:26

## 2020-11-20 ASSESSMENT — PAIN SCALES - GENERAL
PAINLEVEL_OUTOF10: 0

## 2020-11-20 NOTE — PLAN OF CARE
Problem: Falls - Risk of:  Goal: Will remain free from falls  Description: Will remain free from falls  11/20/2020 0937 by Merlinda Skiff, RN  Outcome: Ongoing  Goal: Absence of physical injury  Description: Absence of physical injury  11/20/2020 9211 by Merlinda Skiff, RN  Outcome: Ongoing  Problem: Skin Integrity:  Goal: Will show no infection signs and symptoms  Description: Will show no infection signs and symptoms  11/20/2020 0937 by Merlinda Skiff, RN  Outcome: Ongoing  Goal: Absence of new skin breakdown  Description: Absence of new skin breakdown  11/20/2020 0937 by Merlinda Skiff, RN  Outcome: Ongoing

## 2020-11-20 NOTE — ED NOTES
Report to [facility] RN in ICU will be down to take patient to ICU     [facility]  11/19/20 [id number]

## 2020-11-20 NOTE — PLAN OF CARE
Problem: Falls - Risk of:  Goal: Will remain free from falls  Description: Will remain free from falls  Outcome: Met This Shift   Pt remains in bed throughout shift. No attempts made to get up      Problem: Skin Integrity:  Goal: Absence of new skin breakdown  Description: Absence of new skin breakdown  Outcome: Met This Shift  No signs of skin breakdown noted throughout shift.

## 2020-11-20 NOTE — PROGRESS NOTES
RESPIRATORY THERAPY ASSESSMENT    Name:  Diamond Hollingsworth  Medical Record Number:  [id number]  Age: 82 y.o. Gender: female  : 1935  Today's Date:  2020  Room:  3083/6652-49    Assessment     Is the patient being admitted for a COPD or Asthma exacerbation? No   (If yes the patient will be seen every 4 hours for the first 24 hours and then reassessed)    Patient Admission Diagnosis: acute hypercapnic respiratory failure, hypotension, adrenal insufficiency      Allergies  Allergies   Allergen Reactions    Decadron [Dexamethasone]      Per patient: reaction happened over 50 years ago. Tolerates oral prednisone.  Dye [Iodides]      IV dye    Pcn [Penicillins]     Percocet [Oxycodone-Acetaminophen]     Vicodin [Hydrocodone-Acetaminophen] Nausea And Vomiting     PATIENT TAKES @ HOME WITH PHENERGAN         Pulmonary History:COPD and MELISSA  Home Oxygen Therapy:  2 liters/min via nasal cannula  Home Respiratory Therapy:Budesonide/Formoterol    Current Respiratory Therapy:  HHN Duoneb Q4H w/a, Symbicort MDI BID, O2 protocol          Respiratory Severity Index(RSI)   Patients with orders for inhalation medications, oxygen, or any therapeutic treatment modality will be placed on Respiratory Protocol. They will be assessed with the first treatment and at least every 72 hours thereafter. The following severity scale will be used to determine frequency of treatment intervention.     Smoking History: Pulmonary Disease or Smoking History, Greater than 15 pack year = 2    Social History  Social History     Tobacco Use    Smoking status: Former Smoker    Smokeless tobacco: Never Used   Substance Use Topics    Alcohol use: No    Drug use: No       Recent Surgical History: None = 0  Past Surgical History  Past Surgical History:   Procedure Laterality Date    CHOLECYSTECTOMY      GASTRIC BYPASS SURGERY      HERNIA REPAIR      HYSTERECTOMY         Level of Consciousness: Alert, Oriented, and Cooperative = 0    Level of Activity: Mostly sedentary, minimal walking = 2    Respiratory Pattern: Regular Pattern; RR 8-20 = 0    Breath Sounds: Diminshed bilaterally and/or crackles = 2    Sputum   ,  ,    Cough: Strong, spontaneous, non-productive = 0    Vital Signs   /62   Pulse 57   Temp 98 °F (36.7 °C) (Oral)   Resp 17   Ht 5' 1\" (1.549 m)   Wt 114 lb 10.2 oz (52 kg)   LMP  (LMP Unknown)   SpO2 97%   BMI 21.66 kg/m²   SPO2 (COPD values may differ): 86-87% on room air or greater than 92% on FiO2 35- 50% = 3    Peak Flow (asthma only): not applicable = 0    RSI: 0-69 = TID (three times daily) and Q4hr PRN for dyspnea        Plan       Goals: medication delivery and improve oxygenation    Patient/caregiver was educated on the proper method of use for Respiratory Care Devices:  Yes      Level of patient/caregiver understanding able to:   ? Verbalize understanding   ? Demonstrate understanding       ? Teach back        ? Needs reinforcement       ? No available caregiver               ? Other:     Response to education:  Good     Is patient being placed on Home Treatment Regimen? No     Does the patient have everything they need prior to discharge? NA     Comments: Chart reviewed. Plan of Care: HHN Duoneb Q4H w/a and PRN, Symbicort MDI BID, O2 protocol, BIPAP as ordered. Electronically signed by Alina Chan RCP on 11/20/2020 at 6:04 AM    Respiratory Protocol Guidelines     1. Assessment and treatment by Respiratory Therapy will be initiated for medication and therapeutic interventions upon initiation of aerosolized medication. 2. Physician will be contacted for respiratory rate (RR) greater than 35 breaths per minute. Therapy will be held for heart rate (HR) greater than 140 beats per minute, pending direction from physician. 3. Bronchodilators will be administered via Metered Dose Inhaler (MDI) with spacer when the following criteria are met:  a.  Alert and cooperative     b. HR < 140 bpm  c. RR < 30 bpm                d. Can demonstrate a 2-3 second inspiratory hold  4. Bronchodilators will be administered via Hand Held Nebulizer [facility]) to patients when ANY of the following criteria are met  a. Incognizant or uncooperative          b. Patients treated with HHN at Home        c. Unable to demonstrate proper use of MDI with spacer     d. RR > 30 bpm   5. Bronchodilators will be delivered via Metered Dose Inhaler (MDI), HHN, Aerogen to intubated patients on mechanical ventilation. 6. Inhalation medication orders will be delivered and/or substituted as outlined below. Aerosolized Medications Ordering and Administration Guidelines:    1. All Medications will be ordered by a physician, and their frequency and/or modality will be adjusted as defined by the patients Respiratory Severity Index (RSI) score. 2. If the patient does not have documented COPD, consider discontinuing anticholinergics when RSI is less than 9.  3. If the bronchospasm worsens (increased RSI), then the bronchodilator frequency can be increased to a maximum of every 4 hours. If greater than every 4 hours is required, the physician will be contacted. 4. If the bronchospasm improves, the frequency of the bronchodilator can be decreased, based on the patient's RSI, but not less than home treatment regimen frequency. 5. Bronchodilator(s) will be discontinued if patient has a RSI less than 9 and has received no scheduled or as needed treatment for 72  Hrs. Patients Ordered on a Mucolytic Agent:    1. Must always be administered with a bronchodilator. 2. Discontinue if patient experiences worsened bronchospasm, or secretions have lessened to the point that the patient is able to clear them with a cough. Anti-inflammatory and Combination Medications:    1.  If the patient lacks prior history of lung disease, is not using inhaled anti-inflammatory medication at home, and lacks wheezing by examination or by history for at least 24 hours, contact physician for possible discontinuation.

## 2020-11-20 NOTE — CARE COORDINATION
Addendum:    Patient's daughter called back and pt is from home. If she is able to return to home they would like [facility] back if home care is needed. If she needs a facility they would like [facility]. The patient is normally able to ambulate in the house with her walker and can dress herself. Her son and daughter are there 24/7 with her and help with bathing, meals and meds. She has a portable WC and regular WC and they have a ramped entrance to the house. It is a single level home and they have all DME needed. She has her O2 through [address]. Case Management Assessment           Initial Evaluation                Date / Time of Evaluation: 11/20/2020 9:32 AM                 Assessment Completed by: Kaity Edward     Patient is currently in isolation and on BiPap so I called the patient's daughter Romain Dixon but had to leave a message. After reviewing the notes it looks like the patient is from home and her children care for her. She is on 2L O2 at home and goes to [facility] for dialysis on T/TH and Sat. Patient had [facility] in the past but was recently discharged from their care on 10/14. Patient's daughter contacted [employer] for assistance but when I called they were not sure if she was still enrolled. Patient Name: Angus Dietrich     YOB: 1935  Diagnosis: COPD exacerbation ([facility] [address] [J44.1]  COPD exacerbation ([facility] [address] [J44.1]     Date / Time: 11/19/2020  2:37 PM    Patient Admission Status: Inpatient    If patient is discharged prior to next notation, then this note serves as note for discharge by case management.      Current PCP: Angeline Spaulding MD  Clinic Patient: No    Chart Reviewed: Yes  Patient/ Family Interviewed: No    Initial assessment completed at bedside with: per notes-left messaged with daughter    Hospitalization in the last 30 days: No    Emergency Contacts:  Extended Emergency Contact Information  Primary Emergency Contact: Gloria Bean  Address: [address] [place] [address], [address] [facility] [address]  Phone: [phone]  Work Phone: [phone]  Relation: Child  Secondary Emergency Contact: Lorena Elizondo [facility]  Address: [address] [facility] [address] [facility], [address] [facility] [address] Phone: [phone]  Relation: Child    Advance Directives:   Code Status: [address]: Yes  Agent: Rajwinder Bray  Contact Number: [phone]    Copy present: No     In paper Chart: No    Scanned into EMR No    Financial  Payor: Aleene Councilman / Plan: Yoko Snyder PPO / Product Type: Medicare /     Pre-cert required for SNF: Yes    Pharmacy  No Pharmacies Listed    Potential assistance Purchasing Medications:    Does Patient want to participate in local refill/ meds to beds program?:      Meds To Beds General Rules:  1. Can ONLY be done Monday- Friday between 8:30am-5pm  2. Prescription(s) must be in pharmacy by 3pm to be filled same day  3. Copy of patient's insurance/ prescription drug card and patient face sheet must be sent along with the prescription(s)  4. Cost of Rx cannot be added to hospital bill. If financial assistance is needed, please contact unit  or ;  or  CANNOT provide pharmacy voucher for patients co-pays  5.  Patients can then  the prescription on their way out of the hospital at discharge, or pharmacy can deliver to the bedside if staff is available. (payment due at time of pick-up or delivery - cash, check, or card accepted)     Able to afford home medications/ co-pay costs: Yes    ADLS  Support Systems:      PT AM-PAC:   /24  OT AM-PAC:   /24    [place]: home  Steps: unsure    Plans to RETURN to current housing: Yes  Barriers to RETURNING to current housing: none noted      Currently ACTIVE with [place] on Aging: No  Passport/ Waiver: No  Passport/ Waiver Services: Not Applicable    Case Manager: SAUL Phone: NA    Home Oxygen and [address] prior to admission: Yes  [address]: Not Applicable  Other Respiratory Equipment: NA    Informed of need to bring portable home O2 tank on day of DISCHARGE for nursing to connect prior to leaving: No  Verbalized agreement/Understanding: No  Person to bring portable tank at discharge: unable to speak with patient or family    Dialysis  Active with HD/PD prior to admission: Yes  Nephrologist: Dr Rafa Stoll:  Fara Vásquez  Address: [address] [place] [address]  Phone: [phone]    DISCHARGE PLAN:  Disposition:TBD    Transportation PLAN for discharge: TBD     Factors facilitating achievement of predicted outcomes: Family support    Barriers to discharge: currently in covid r/o    Additional Case Management Notes:Patient has gone to [facility] in the past    The Plan for Transition of Care is related to the following treatment goals of COPD exacerbation ([facility] [address] [J44.1]  COPD exacerbation ([address] [J44.1]    The Patient and/or patient representative Coco and her family were provided with a choice of provider and agrees with the discharge plan Not Indicated    Freedom of choice list was provided with basic dialogue that supports the patient's individualized plan of care/goals and shares the quality data associated with the providers.  Not Indicated    Care Transition patient: No    Sathish Argueta  Case Management Department  Ph: [phone]   Fax: [phone]

## 2020-11-20 NOTE — PROGRESS NOTES
NEPHROLOGIST DIALYSIS NOTE:    Seen during dialysis  Vitals and labs as given below. Please see today's consult note for remaining details    Vitals:    11/20/20 1600   BP: (!) 140/69   Pulse: 59   Resp: 15   Temp:    SpO2: 100%     Lab Results   Component Value Date     11/20/2020    K 6.3 11/20/2020    CL 98 11/20/2020    CO2 17 11/20/2020     11/20/2020    CREATININE 7.7 11/20/2020    GLUCOSE 165 11/20/2020    CALCIUM 7.8 11/20/2020           Please call with questions at-  24 Hrs Answering service [phone]  Perfect serve, or cell phone 7 am - [facility] nephrology  [email or web address]

## 2020-11-20 NOTE — PROGRESS NOTES
Assessment complete/ pt is [place]/ lethargic/ arouseable/ does follow + does answer simple yes no questions appropriate/ appears very weak/ remains on bipap is having frequent tidal volumes less than 200/ pt is oxygenating / remains NPO/ pending AM rounds with ICU Team + nephrology for further plan of care/ cont to monitor and assess

## 2020-11-20 NOTE — PROGRESS NOTES
Lab notified again/ need assist with redraw/ lab personnel will send tech    1000   at bedside to redraw chemistry/ difficult + multi stick phlebotomist     1130 receive lab results/ notified Dr Sales Alexis Dr Suzi Gamboa ( nephrology)/ plan for hemodialysis today/ pt remains hemodynamically stable/ no EKG changes/ cont to monitor and assess    1200 pt given insulin/dextros/calcium for high k while waiting for  Hemodialysis nurse for Tx/  Obtain EKG as well/ see orders/ continue to monitor and assess    1330 Hemodialysis tech at bedside preparing for HD Tx/ continue to monitor and assess    1540 pt having jerking motion bilat shoulders/ pt states  im ok when ask/pt does not appear to realize she is having this jerking movement/ remains on HD/ on nasal cannula/ hemodynamically stable/ ask Dr Allison Khan to lay eyes on pt/ did so/ will cont to monitor and let him know of any changes with hemodynamics + mentation/ cont to monitor and assess

## 2020-11-20 NOTE — CONSULTS
[facility]    [facility]              [phone]                      Marius Sandifer is admitted with respiratory failure. We are following for ESRD    Interval History and plan: Will do HD. Keep neutral volume. Continue calcitriol                     Assessment :        ESRD  - does not appear volume overloaded  - has functioning right arm graft  - K and BUN are high. - Will do HD  - daily RFP    Acidemia  - primarily respiratory  - there is some AGMA. Lactate was normal    Anemia  - is 11  - no need for EPO at this time    Secondary hyperparathyroidism  - continue calcitriol      [facility] would like to thank Sophia Couch MD   for opportunity to serve this patient         CC/reason for consult :     ESRD     HPI :     Marius Sandifer is a 82 y.o. female presented to the hospital on 11/19/2020 with AMS. Was at outpatient dialysis. During dialysis she had altered status and hypotension with a systolic in the 59C and was becoming more somnolent and sent to the [facility] ED. Patient hypoxic and hypercapnic. Started on Bipap. No gross volume overload on CXR. ROS:     Unable to obtain ROS: AMS, Minimal verbal response. Wearing BIPAP. Somnolent.        PMH/PSH/SH/Family History:     Past Medical History:   Diagnosis Date    Abscess     Abscess     Anemia     Back pain     Chronic kidney disease     Clostridium difficile infection 6/7/15;3/15/15; 3/24/15    6/7 nursing home report positive; PCR    COPD (chronic obstructive pulmonary disease) ([facility])     baseline O2 is 2L / min    Dementia ([facility])     Depression     DM2 (diabetes mellitus, type 2) ([facility] [address]     ESRD (end stage renal disease) ([facility] [address]     HD on Tu/Th/ Sa    Hypertension     Hypothyroidism     Obesity hypoventilation syndrome (HCC)     MELISSA (obstructive sleep apnea)     Other activity(E029.9)     chronic loose bms     Oxygen dependent     2 LPM    Parkinsonism ([facility])     Pseudotumor cerebri     Restrictive lung disease     Spinal stenosis        Past Surgical History:   Procedure Laterality Date    CHOLECYSTECTOMY      GASTRIC BYPASS SURGERY      HERNIA REPAIR      HYSTERECTOMY          reports that she has quit smoking. She has never used smokeless tobacco. She reports that she does not drink alcohol or use drugs. family history includes Diabetes in an other family member; Hypertension in an other family member; Johnnye Sports in her sister.          Medication:     Current Facility-Administered Medications: atorvastatin (LIPITOR) tablet 10 mg, 10 mg, Oral, Nightly  budesonide-formoterol (SYMBICORT) 160-4.5 MCG/ACT inhaler 1 puff, 1 puff, Inhalation, BID  calcitRIOL (ROCALTROL) capsule 0.25 mcg, 0.25 mcg, Oral, Every Other Day  citalopram (CELEXA) tablet 10 mg, 10 mg, Oral, Nightly  donepezil (ARICEPT) tablet 10 mg, 10 mg, Oral, Nightly  fludrocortisone (FLORINEF) tablet 0.1 mg, 0.1 mg, Oral, BID  levothyroxine (SYNTHROID) tablet 100 mcg, 100 mcg, Oral, Daily  loperamide (IMODIUM) capsule 2 mg, 2 mg, Oral, TID  methylcellulose (CITRUCEL) oral powder 2 g, 2 g, Oral, BID  sodium chloride flush 0.9 % injection 10 mL, 10 mL, Intravenous, 2 times per day  sodium chloride flush 0.9 % injection 10 mL, 10 mL, Intravenous, PRN  acetaminophen (TYLENOL) tablet 650 mg, 650 mg, Oral, Q6H PRN **OR** acetaminophen (TYLENOL) suppository 650 mg, 650 mg, Rectal, Q6H PRN  polyethylene glycol (GLYCOLAX) packet 17 g, 17 g, Oral, Daily PRN  promethazine (PHENERGAN) tablet 12.5 mg, 12.5 mg, Oral, Q6H PRN **OR** ondansetron (ZOFRAN) injection 4 mg, 4 mg, Intravenous, Q6H PRN  heparin (porcine) injection 5,000 Units, 5,000 Units, Subcutaneous, 3 times per day  [COMPLETED] azithromycin (ZITHROMAX) 500 mg in dextrose 5 % 250 mL IVPB, 500 mg, Intravenous, Once **FOLLOWED BY** [START ON 11/21/2020] azithromycin (ZITHROMAX) 250 mg in dextrose 5 % 250 mL IVPB, 250 mg, Intravenous, Q24H  glucose (GLUTOSE) 40 % oral gel 15 g, 15 g, Oral, PRN  dextrose 50 % IV solution, 12.5 g, Intravenous, PRN  glucagon (rDNA) injection 1 mg, 1 mg, Intramuscular, PRN  dextrose 5 % solution, 100 mL/hr, Intravenous, PRN  cosyntropin (CORTROSYN) injection 250 mcg, 250 mcg, Intravenous, Once  methylPREDNISolone sodium (SOLU-MEDROL) injection 40 mg, 40 mg, Intravenous, Daily  ipratropium-albuterol (DUONEB) nebulizer solution 1 ampule, 1 ampule, Inhalation, 4x daily  ipratropium-albuterol (DUONEB) nebulizer solution 1 ampule, 1 ampule, Inhalation, PRN  calcium gluconate 2 g in dextrose 5 % 100 mL IVPB, 2 g, Intravenous, Once       Vitals :     Vitals:    11/20/20 1100   BP: (!) 139/59   Pulse: 61   Resp: 17   Temp:    SpO2: 95%       I & O :       Intake/Output Summary (Last 24 hours) at 11/20/2020 1222  Last data filed at 11/20/2020 0900  Gross per 24 hour   Intake 683.93 ml   Output 0 ml   Net 683.93 ml        Physical Examination :     General appearance: Anxious- no, distressed- no. Is somnolent. Wearing bipap. Minimal verbal response. HEENT: Lips- normal, teeth- ok , oral mucosa- moist  Neck : Mass- no, appears symmetrical, JVD- not visible  Respiratory: Respiratory effort- on bipap, wheeze- yes, crackles - no  Cardiovascular: Ausculation- No M/R/G, Edema no  Abdomen: visible mass- no, distention- no, scar- no, tenderness- no                            hepatosplenomegaly-  no  Musculoskeletal:  clubbing no,cyanosis- no , digital ischemia- no                           muscle strength- grossly normal , tone - grossly normal  Skin: rashes- no , ulcers- no, induration- no, tightening - no  Psychiatric:  Judgement and insight- normal           AAO X 3  Additional finding: right arm graft has palpable thrill and audible murmur. left arm graft has no palpable thrill or audible murmur.      LABS:     Recent Labs     11/19/20  1540 11/20/20  0209   WBC 12.1* 10.3   HGB 11.3* 11.1*   HCT 39.6 37.1   PLT 89* 93*     Recent Labs     11/19/20  1540 11/20/20  020 11/20/20  0540 11/20/20  1004    135*  --  135*   K 5.0 6.3* 5.7* 6.3*    99  --  98*   CO2 17* 19*  --  17*   BUN 84* 90*  --  103*   CREATININE 7.6* 8.0*  --  7.7*   GLUCOSE 97 152*  --  165*   MG 1.70*  --   --   --    PHOS 8.3*  --   --   --         Manda Trujillo PGY2  12:22 PM  11/20/2020    Discussed with Dr Abrahan Pelaez      Patient was seen and examined and the case was discussed with the resident. He acted as my scribe. I agree with the assessment and plan.     ESRD: HD arranged for today     Thanks  Nephrology  [address], [address]  Office: [phone]  Cell: [phone]  Fax: [phone]

## 2020-11-20 NOTE — PROGRESS NOTES
Progress Note  Admit Date: 11/19/2020            82 y.o. female with  COPD, adrenal insufficiency, CAD, MELISSA who presented with confusion,  fatigue.       She originally went to [facility] ED where she was told to go to outpatient dialysis. During dialysis she had altered status and hypotension with a systolic in the 60W and was becoming more somnolent and sent to the [facility] ED. When I saw the patient she was awake and alert. She say she has felt tired and confused today but was alert and oriented x3. She denied any fever, chills, cough, chest pain, SOB, abdominal pain, nausea, vomitting, or diarrhea.     In the ED lab work was afebrile, normal RR, not tachycardic, and hypotensive to 77/34 and received 500 ml of fluid. Labs showed K of 5, Cr 7.6, AG 18, Mg 1.7, BUN 84, phos 8.3, glucose 97, troponin .08, WBC 12.1, HG 11.3, and ABG showed respiratory acidosis with pH 6.98, pCO2 87.5. She was started on BiPAP in the ED. CXR showed expiratory lungs with no gross evidence for acute disease. No new complaints today  No chest pain        Review of Systems - Negative apart from as in the HPI above.       Scheduled Medications:    atorvastatin  10 mg Oral Nightly    budesonide-formoterol  1 puff Inhalation BID    calcitRIOL  0.25 mcg Oral Every Other Day    citalopram  10 mg Oral Nightly    donepezil  10 mg Oral Nightly    fludrocortisone  0.1 mg Oral BID    levothyroxine  100 mcg Oral Daily    sodium chloride flush  10 mL Intravenous 2 times per day    heparin (porcine)  5,000 Units Subcutaneous 3 times per day    [START ON 11/21/2020] azithromycin  250 mg Intravenous Q24H    cosyntropin  250 mcg Intravenous Once    methylPREDNISolone  40 mg Intravenous Daily    ipratropium-albuterol  1 ampule Inhalation 4x daily      PRN Medications: sodium chloride flush, acetaminophen **OR** acetaminophen, polyethylene glycol, promethazine **OR** ondansetron, glucose, dextrose, glucagon (rDNA), dextrose, ipratropium-albuterol, loperamide  Diet: Diet NPO Effective Now Exceptions are: Sips with Meds, Ice Chips    Continuous Infusions:   dextrose         PHYSICAL EXAM:  BP (!) 140/69   Pulse 59   Temp 97.5 °F (36.4 °C)   Resp 15   Ht 5' 1\" (1.549 m)   Wt 114 lb 10.2 oz (52 kg)   LMP  (LMP Unknown)   SpO2 100%   BMI 21.66 kg/m²   Recent Labs     11/19/20  1445 11/20/20  1335   POCGLU 86 168*       Intake/Output Summary (Last 24 hours) at 11/20/2020 1814  Last data filed at 11/20/2020 1600  Gross per 24 hour   Intake 1343.93 ml   Output 0 ml   Net 1343.93 ml       Constitutional:       General: She is not in acute distress, ill-appearing. HENT: BiPAP in place   Cardiovascular:      Rate and Rhythm: Normal rate and regular rhythm. Heart sounds: No murmur. No friction rub. No gallop. Pulmonary:      Breath sounds: diminished bilaterally  Abdominal:  There is no distension. There is no abdominal tenderness. Musculoskeletal:     Right lower leg: No edema. Left lower leg: No edema. Neurological: Grossly non-focal        LABS:  Recent Labs     11/19/20  1540 11/20/20  0209   WBC 12.1* 10.3   HGB 11.3* 11.1*   HCT 39.6 37.1   PLT 89* 93*                                                                    Recent Labs     11/19/20  1540 11/20/20  0209 11/20/20  0540 11/20/20  1004    135*  --  135*   K 5.0 6.3* 5.7* 6.3*    99  --  98*   CO2 17* 19*  --  17*   BUN 84* 90*  --  103*   CREATININE 7.6* 8.0*  --  7.7*   GLUCOSE 97 152*  --  165*     Recent Labs     11/19/20  1540   AST 19   ALT 9*   BILITOT 0.4   ALKPHOS 109     Recent Labs     11/19/20  1540 11/20/20  0209 11/20/20  1004   TROPONINI 0.08* 0.08* 0.10*     No results for input(s): BNP in the last 72 hours. No results for input(s): CHOL, HDL in the last 72 hours. Invalid input(s): LDLCALCU  No results for input(s): INR in the last 72 hours.     Assessment & Plan:     82 y.o. female with  COPD, adrenal insufficiency, CAD, MELISSA who presented with confusion,  fatigue.         Acute Hypercapnic Respiratory Failure  Acute metabolic encephalopathy  Patient somnolent on presentation with respiratory acidosis. Started on BiPAP in the ED. History of COPD with wheezing on exam, likely due to COPD exacerbation. Given dose of Cefepime and vancomycin in the ED. Low suspicion for pneumonia without fever, tachycardia, and clean CXR but patient does have leukocytosis. - Wean off BiPAP  - DuoNebs   - Keep off antibx except indication identified.   - COVID-19 pending       Hypotension  Patient with hypotension in Dialysis and initial BP of 77/34 in the ED  Received 500 mL in the ED. BP improved with 500ml to 104/80. Could be 2/2 to adrenal insufficiency or dialysis earlier in the day. No concern for sepsis, lactate normal.   - Improved. Received 2 liters of IVF       Adrenal Insufficiency   History of adrenal insufficiency on fludrocortisone. Consider stress dose steroid        Elevated Troponin: Likely sec to ESRD. ECG with no evidence of ischemia. - Trend troponins       ESRD on Dialysis  No acute indication for emergent dialysis. - Nephrology consulted       DM Type 2  Not on any medications at home. - Hypoglycemia protocol       Alzheimer  Continue Donezapil            The patient and / or the family were informed of the results of any tests, a time was given to answer questions, a plan was proposed and they agreed with plan.       DNR-CCA       Disposition: Possible transfer from ICU if off Nina Gambino MD

## 2020-11-20 NOTE — ED NOTES
Dr. Valdemar Zaidi made aware repeat ABG results resulted, wants patient to go to ICU as planned earlier.      Kristine Sethi RN  11/19/20 7078

## 2020-11-20 NOTE — CONSULTS
Consult received. Labs and notes were reviewed. Case was discussed with the staff. Full note to follow.     Thanks  Nephrology  Jatinder Quijano [address]  Office: [phone]    Fax: [phone]

## 2020-11-21 LAB
ALBUMIN SERPL-MCNC: 3.5 G/DL (ref 3.4–5)
ALBUMIN SERPL-MCNC: 3.6 G/DL (ref 3.4–5)
ANION GAP SERPL CALCULATED.3IONS-SCNC: 11 MMOL/L (ref 3–16)
ANION GAP SERPL CALCULATED.3IONS-SCNC: 14 MMOL/L (ref 3–16)
BASE EXCESS ARTERIAL: -2 (ref -3–3)
BASOPHILS ABSOLUTE: 0 K/UL (ref 0–0.2)
BASOPHILS RELATIVE PERCENT: 0.2 %
BUN BLDV-MCNC: 43 MG/DL (ref 7–20)
BUN BLDV-MCNC: 44 MG/DL (ref 7–20)
CALCIUM SERPL-MCNC: 8.2 MG/DL (ref 8.3–10.6)
CALCIUM SERPL-MCNC: 8.2 MG/DL (ref 8.3–10.6)
CHLORIDE BLD-SCNC: 97 MMOL/L (ref 99–110)
CHLORIDE BLD-SCNC: 98 MMOL/L (ref 99–110)
CO2: 22 MMOL/L (ref 21–32)
CO2: 24 MMOL/L (ref 21–32)
CREAT SERPL-MCNC: 4.5 MG/DL (ref 0.6–1.2)
CREAT SERPL-MCNC: 4.6 MG/DL (ref 0.6–1.2)
EOSINOPHILS ABSOLUTE: 0 K/UL (ref 0–0.6)
EOSINOPHILS RELATIVE PERCENT: 0.1 %
GFR AFRICAN AMERICAN: 11
GFR AFRICAN AMERICAN: 11
GFR NON-AFRICAN AMERICAN: 9
GFR NON-AFRICAN AMERICAN: 9
GLUCOSE BLD-MCNC: 100 MG/DL (ref 70–99)
GLUCOSE BLD-MCNC: 107 MG/DL (ref 70–99)
GLUCOSE BLD-MCNC: 114 MG/DL (ref 70–99)
GLUCOSE BLD-MCNC: 141 MG/DL (ref 70–99)
HCO3 ARTERIAL: 26 MMOL/L (ref 21–29)
HCT VFR BLD CALC: 33.2 % (ref 36–48)
HEMOGLOBIN: 10.2 G/DL (ref 12–16)
LACTATE: <0.3 MMOL/L (ref 0.4–2)
LYMPHOCYTES ABSOLUTE: 0.6 K/UL (ref 1–5.1)
LYMPHOCYTES RELATIVE PERCENT: 8.2 %
MCH RBC QN AUTO: 29.4 PG (ref 26–34)
MCHC RBC AUTO-ENTMCNC: 30.6 G/DL (ref 31–36)
MCV RBC AUTO: 95.8 FL (ref 80–100)
MONOCYTES ABSOLUTE: 1 K/UL (ref 0–1.3)
MONOCYTES RELATIVE PERCENT: 13 %
NEUTROPHILS ABSOLUTE: 5.7 K/UL (ref 1.7–7.7)
NEUTROPHILS RELATIVE PERCENT: 78.5 %
O2 SAT, ARTERIAL: 98 % (ref 93–100)
PCO2 ARTERIAL: 65.8 MM HG (ref 35–45)
PDW BLD-RTO: 17.3 % (ref 12.4–15.4)
PERFORMED ON: ABNORMAL
PH ARTERIAL: 7.21 (ref 7.35–7.45)
PHOSPHORUS: 5.9 MG/DL (ref 2.5–4.9)
PHOSPHORUS: 5.9 MG/DL (ref 2.5–4.9)
PLATELET # BLD: 102 K/UL (ref 135–450)
PMV BLD AUTO: 9.5 FL (ref 5–10.5)
PO2 ARTERIAL: 141.2 MM HG (ref 75–108)
POC FIO2: 2 (ref 7.35–7.45)
POC SAMPLE TYPE: ABNORMAL
POTASSIUM SERPL-SCNC: 4.7 MMOL/L (ref 3.5–5.1)
POTASSIUM SERPL-SCNC: 8.6 MMOL/L (ref 3.5–5.1)
RBC # BLD: 3.46 M/UL (ref 4–5.2)
SODIUM BLD-SCNC: 130 MMOL/L (ref 136–145)
SODIUM BLD-SCNC: 136 MMOL/L (ref 136–145)
TCO2 ARTERIAL: 28 MMOL/L
WBC # BLD: 7.3 K/UL (ref 4–11)

## 2020-11-21 PROCEDURE — 6360000002 HC RX W HCPCS: Performed by: STUDENT IN AN ORGANIZED HEALTH CARE EDUCATION/TRAINING PROGRAM

## 2020-11-21 PROCEDURE — 2580000003 HC RX 258: Performed by: STUDENT IN AN ORGANIZED HEALTH CARE EDUCATION/TRAINING PROGRAM

## 2020-11-21 PROCEDURE — 2700000000 HC OXYGEN THERAPY PER DAY

## 2020-11-21 PROCEDURE — 99233 SBSQ HOSP IP/OBS HIGH 50: CPT | Performed by: INTERNAL MEDICINE

## 2020-11-21 PROCEDURE — 6370000000 HC RX 637 (ALT 250 FOR IP): Performed by: INTERNAL MEDICINE

## 2020-11-21 PROCEDURE — 36600 WITHDRAWAL OF ARTERIAL BLOOD: CPT

## 2020-11-21 PROCEDURE — 85025 COMPLETE CBC W/AUTO DIFF WBC: CPT

## 2020-11-21 PROCEDURE — 1200000000 HC SEMI PRIVATE

## 2020-11-21 PROCEDURE — 80069 RENAL FUNCTION PANEL: CPT

## 2020-11-21 PROCEDURE — 83605 ASSAY OF LACTIC ACID: CPT

## 2020-11-21 PROCEDURE — 94640 AIRWAY INHALATION TREATMENT: CPT

## 2020-11-21 PROCEDURE — 36415 COLL VENOUS BLD VENIPUNCTURE: CPT

## 2020-11-21 PROCEDURE — 6370000000 HC RX 637 (ALT 250 FOR IP): Performed by: STUDENT IN AN ORGANIZED HEALTH CARE EDUCATION/TRAINING PROGRAM

## 2020-11-21 PROCEDURE — 94761 N-INVAS EAR/PLS OXIMETRY MLT: CPT

## 2020-11-21 PROCEDURE — 82803 BLOOD GASES ANY COMBINATION: CPT

## 2020-11-21 PROCEDURE — 94660 CPAP INITIATION&MGMT: CPT

## 2020-11-21 PROCEDURE — 90935 HEMODIALYSIS ONE EVALUATION: CPT

## 2020-11-21 RX ORDER — BUDESONIDE 0.5 MG/2ML
0.5 INHALANT ORAL 2 TIMES DAILY
Status: DISCONTINUED | OUTPATIENT
Start: 2020-11-21 | End: 2020-11-24 | Stop reason: HOSPADM

## 2020-11-21 RX ORDER — ARFORMOTEROL TARTRATE 15 UG/2ML
15 SOLUTION RESPIRATORY (INHALATION) 2 TIMES DAILY
Status: DISCONTINUED | OUTPATIENT
Start: 2020-11-21 | End: 2020-11-24 | Stop reason: HOSPADM

## 2020-11-21 RX ADMIN — BUDESONIDE INHALATION 500 MCG: 0.5 SUSPENSION RESPIRATORY (INHALATION) at 20:50

## 2020-11-21 RX ADMIN — FLUDROCORTISONE ACETATE 0.1 MG: 0.1 TABLET ORAL at 21:44

## 2020-11-21 RX ADMIN — IPRATROPIUM BROMIDE AND ALBUTEROL SULFATE 1 AMPULE: .5; 3 SOLUTION RESPIRATORY (INHALATION) at 11:47

## 2020-11-21 RX ADMIN — IPRATROPIUM BROMIDE AND ALBUTEROL SULFATE 1 AMPULE: .5; 3 SOLUTION RESPIRATORY (INHALATION) at 16:28

## 2020-11-21 RX ADMIN — HEPARIN SODIUM 5000 UNITS: 5000 INJECTION INTRAVENOUS; SUBCUTANEOUS at 07:01

## 2020-11-21 RX ADMIN — IPRATROPIUM BROMIDE AND ALBUTEROL SULFATE 1 AMPULE: .5; 3 SOLUTION RESPIRATORY (INHALATION) at 07:46

## 2020-11-21 RX ADMIN — HEPARIN SODIUM 5000 UNITS: 5000 INJECTION INTRAVENOUS; SUBCUTANEOUS at 14:51

## 2020-11-21 RX ADMIN — IPRATROPIUM BROMIDE AND ALBUTEROL SULFATE 1 AMPULE: .5; 3 SOLUTION RESPIRATORY (INHALATION) at 20:50

## 2020-11-21 RX ADMIN — DONEPEZIL HYDROCHLORIDE 10 MG: 10 TABLET, FILM COATED ORAL at 21:45

## 2020-11-21 RX ADMIN — CITALOPRAM HYDROBROMIDE 10 MG: 10 TABLET ORAL at 21:45

## 2020-11-21 RX ADMIN — BUDESONIDE INHALATION 500 MCG: 0.5 SUSPENSION RESPIRATORY (INHALATION) at 11:47

## 2020-11-21 RX ADMIN — LEVOTHYROXINE SODIUM 100 MCG: 100 TABLET ORAL at 08:29

## 2020-11-21 RX ADMIN — ARFORMOTEROL TARTRATE 15 MCG: 15 SOLUTION RESPIRATORY (INHALATION) at 11:47

## 2020-11-21 RX ADMIN — AZITHROMYCIN MONOHYDRATE 250 MG: 500 INJECTION, POWDER, LYOPHILIZED, FOR SOLUTION INTRAVENOUS at 02:31

## 2020-11-21 RX ADMIN — HEPARIN SODIUM 5000 UNITS: 5000 INJECTION INTRAVENOUS; SUBCUTANEOUS at 21:45

## 2020-11-21 RX ADMIN — FLUDROCORTISONE ACETATE 0.1 MG: 0.1 TABLET ORAL at 08:30

## 2020-11-21 RX ADMIN — Medication 10 ML: at 09:00

## 2020-11-21 RX ADMIN — METHYLPREDNISOLONE SODIUM SUCCINATE 40 MG: 40 INJECTION, POWDER, FOR SOLUTION INTRAMUSCULAR; INTRAVENOUS at 08:30

## 2020-11-21 RX ADMIN — ARFORMOTEROL TARTRATE 15 MCG: 15 SOLUTION RESPIRATORY (INHALATION) at 20:50

## 2020-11-21 RX ADMIN — Medication 10 ML: at 21:45

## 2020-11-21 RX ADMIN — ATORVASTATIN CALCIUM 10 MG: 10 TABLET, FILM COATED ORAL at 21:45

## 2020-11-21 ASSESSMENT — PAIN SCALES - GENERAL
PAINLEVEL_OUTOF10: 0

## 2020-11-21 NOTE — PROGRESS NOTES
Progress Note  Admit Date: 11/19/2020            82 y.o. female with  COPD, adrenal insufficiency, CAD, MELISSA who presented with confusion,  fatigue.       She originally went to [facility] ED where she was told to go to outpatient dialysis. During dialysis she had altered status and hypotension with a systolic in the 86H and was becoming more somnolent and sent to the [facility] ED. When I saw the patient she was awake and alert. She say she has felt tired and confused today but was alert and oriented x3. She denied any fever, chills, cough, chest pain, SOB, abdominal pain, nausea, vomitting, or diarrhea.     In the ED lab work was afebrile, normal RR, not tachycardic, and hypotensive to 77/34 and received 500 ml of fluid. Labs showed K of 5, Cr 7.6, AG 18, Mg 1.7, BUN 84, phos 8.3, glucose 97, troponin .08, WBC 12.1, HG 11.3, and ABG showed respiratory acidosis with pH 6.98, pCO2 87.5. She was started on BiPAP in the ED. CXR showed expiratory lungs with no gross evidence for acute disease. No new complaints today  No chest pain        Review of Systems - Negative apart from as in the HPI above.       Scheduled Medications:    budesonide  0.5 mg Nebulization BID    Arformoterol Tartrate  15 mcg Nebulization BID    atorvastatin  10 mg Oral Nightly    calcitRIOL  0.25 mcg Oral Every Other Day    citalopram  10 mg Oral Nightly    donepezil  10 mg Oral Nightly    fludrocortisone  0.1 mg Oral BID    levothyroxine  100 mcg Oral Daily    sodium chloride flush  10 mL Intravenous 2 times per day    heparin (porcine)  5,000 Units Subcutaneous 3 times per day    cosyntropin  250 mcg Intravenous Once    ipratropium-albuterol  1 ampule Inhalation 4x daily      PRN Medications: sodium chloride flush, acetaminophen **OR** acetaminophen, polyethylene glycol, promethazine **OR** ondansetron, glucose, dextrose, glucagon (rDNA), dextrose, ipratropium-albuterol, loperamide  Diet: DIET RENAL; Dysphagia Minced and Moist; Daily Fluid Restriction: 1200 ml    Continuous Infusions:   dextrose         PHYSICAL EXAM:  BP (!) 145/65   Pulse 66   Temp 98 °F (36.7 °C) (Oral)   Resp 14   Ht 5' 1\" (1.549 m)   Wt 114 lb 10.2 oz (52 kg)   LMP  (LMP Unknown)   SpO2 100%   BMI 21.66 kg/m²   Recent Labs     11/19/20  1445 11/20/20  1335 11/21/20  1153   POCGLU 86 168* 114*       Intake/Output Summary (Last 24 hours) at 11/21/2020 1318  Last data filed at 11/21/2020 1000  Gross per 24 hour   Intake 1750 ml   Output 1220 ml   Net 530 ml       Constitutional:       General: She is not in acute distress, ill-appearing. HENT: BiPAP in place   Cardiovascular:      Rate and Rhythm: Normal rate and regular rhythm. Heart sounds: No murmur. No friction rub. No gallop. Pulmonary:      Breath sounds: diminished bilaterally  Abdominal:  There is no distension. There is no abdominal tenderness. Musculoskeletal:     Right lower leg: No edema. Left lower leg: No edema. Neurological: Grossly non-focal        LABS:  Recent Labs     11/19/20  1540 11/20/20  0209 11/21/20  0349   WBC 12.1* 10.3 7.3   HGB 11.3* 11.1* 10.2*   HCT 39.6 37.1 33.2*   PLT 89* 93* 102*                                                                    Recent Labs     11/20/20  2215 11/21/20  0349 11/21/20  0430    130* 136   K 3.8 8.6* 4.7    97* 98*   CO2 22 22 24   BUN 40* 44* 43*   CREATININE 4.1* 4.6* 4.5*   GLUCOSE 80 141* 107*     Recent Labs     11/19/20  1540   AST 19   ALT 9*   BILITOT 0.4   ALKPHOS 109     Recent Labs     11/19/20  1540 11/20/20  0209 11/20/20  1004   TROPONINI 0.08* 0.08* 0.10*     No results for input(s): BNP in the last 72 hours. No results for input(s): CHOL, HDL in the last 72 hours. Invalid input(s): LDLCALCU  No results for input(s): INR in the last 72 hours. Assessment & Plan:     82 y.o. female with  COPD, adrenal insufficiency, CAD, MELISSA who presented with confusion,  fatigue.       Acute Hypercapnic Respiratory Failure  Acute metabolic encephalopathy  Combined metabolic and respiratory acidosis due to acute on chronic respiratory acidosis and metabolic acidosis believed to be due to ESRD     Patient somnolent on presentation    Started on BiPAP in the ED. History of COPD with wheezing on exam, was felt likely due to COPD exacerbation. Given dose of Cefepime and vancomycin in the ED. Low suspicion for pneumonia without fever, tachycardia, and clean CXR but patient does have leukocytosis. Further antibx held. - Pulmonary does not think COPD exacerbation. Azithro and Steroids discontinued. - Weaned off BiPAP  - DuoNebs   - Keep off antibx except more convincing indication identified.   - Follow cx data  - COVID-19 pending       Hypotension  Patient with hypotension in Dialysis and initial BP of 77/34 in the ED  Received 500 mL in the ED. BP improved with 500ml to 104/80. Could be 2/2 to adrenal insufficiency or dialysis earlier in the day. No concern for sepsis, lactate normal.   - Improved. Received 2 liters of IVF  - Monitor       Adrenal Insufficiency   History of adrenal insufficiency on fludrocortisone. Consider stress dose steroid if low Bps re-noted        Elevated Troponin: Likely sec to ESRD. ECG with no evidence of ischemia. - Trend troponins       ESRD on Dialysis  No acute indication for emergent dialysis. - Nephrology consulted       DM Type 2  Not on any medications at home.   - Hypoglycemia protocol       Alzheimer  Continue Donezapil             DNR-CCA       Disposition: Transfer from ICU  PT/OT eve Schuler MD

## 2020-11-21 NOTE — PROGRESS NOTES
Physician Progress Note      Mohit Gurrola  CSN #:                  [id number]  :                       1935  ADMIT DATE:       2020 2:37 PM  [address] [place] DATE:  RESPONDING  PROVIDER #:        Cricket Hernandez MD          QUERY TEXT:    Pt admitted with somnolence, hypotension and acute hypercapnic respiratory   failure. Pt noted to be lethargic and fatigued, started on BiPAP. If possible,   please document in the progress notes and discharge summary if you are   evaluating and / or treating any of the following: The medical record reflects the following:  Risk Factors: Hypercapnia, ? infectious process  Clinical Indicators: Per ED \"can carry on a brief conversation but then would   fall asleep multiple times\", ABG: PH: 6.98, pCO2: 87.5, Base deficit: 11.9;   Per H&P \"fatigued but alert and oriented\", per Nursing \"lethargic/   arouseable\", Per Pulm \"lethargic but able to follow commands\"; WBC: 12.1,   Procal: 0.57  Treatment: BiPAP, DuoNebs q 6 hrs, Zithromax started , Vanc/ Cefepime in   ED  Options provided:  -- Metabolic encephalopathy secondary to acute hypercapnic respiratory failure  -- Other - I will add my own diagnosis  -- Disagree - Not applicable / Not valid  -- Disagree - Clinically unable to determine / Unknown  -- Refer to Clinical Documentation Reviewer    PROVIDER RESPONSE TEXT:    This patient has metabolic encephalopathy secondary to acute hypercapnic   respiratory failure. Query created by:  Brielle Hardin on 2020 12:13 PM      Electronically signed by:  Cricket Hernandez MD 2020 10:17 PM

## 2020-11-21 NOTE — PLAN OF CARE
Problem: Falls - Risk of:  Goal: Will remain free from falls  Description: Will remain free from falls  11/21/2020 1218 by Yolis Alonso RN  Outcome: Ongoing  Goal: Absence of physical injury  Description: Absence of physical injury  11/21/2020 1218 by Yolis Alonso RN  Outcome: Ongoing     Problem: Skin Integrity:  Goal: Will show no infection signs and symptoms  Description: Will show no infection signs and symptoms  11/21/2020 1218 by Yolis Alonso RN  Outcome: Ongoing  Goal: Absence of new skin breakdown  Description: Absence of new skin breakdown  11/21/2020 1218 by Yolis Alonso RN  Outcome: Ongoing

## 2020-11-21 NOTE — PROGRESS NOTES
Assessment complete/ obtained ABG as ordered + forwarded results to ICU Resident team/ pt is awake and engaged  With conversation at times/ continues to follow commands and answers yes+ no questions appropriate/continues to appear weak physically + moves sysmmetrically  Plan for hemodialysis today/ will start diet/ cont to monitor and assess

## 2020-11-21 NOTE — PROGRESS NOTES
ICU Progress Note    Admit Date: 11/19/2020  Day: 1  Vent Day: None  IV Access:Peripheral  IV Fluids:None  Vasopressors:None                Antibiotics: None  Diet: Diet NPO Effective Now Exceptions are: Sips with Meds, Ice Chips    CC: AMS    Interval history: Patient seen and examined this morning. No overnight events. Appears some what confused, but is pleasant. Follows some commands. Per RN, patient's mental status waxes and wanes. ROS could not be obtained.          Medications:     Scheduled Meds:   atorvastatin  10 mg Oral Nightly    budesonide-formoterol  1 puff Inhalation BID    calcitRIOL  0.25 mcg Oral Every Other Day    citalopram  10 mg Oral Nightly    donepezil  10 mg Oral Nightly    fludrocortisone  0.1 mg Oral BID    levothyroxine  100 mcg Oral Daily    sodium chloride flush  10 mL Intravenous 2 times per day    heparin (porcine)  5,000 Units Subcutaneous 3 times per day    azithromycin  250 mg Intravenous Q24H    cosyntropin  250 mcg Intravenous Once    methylPREDNISolone  40 mg Intravenous Daily    ipratropium-albuterol  1 ampule Inhalation 4x daily     Continuous Infusions:   dextrose       PRN Meds:sodium chloride flush, acetaminophen **OR** acetaminophen, polyethylene glycol, promethazine **OR** ondansetron, glucose, dextrose, glucagon (rDNA), dextrose, ipratropium-albuterol, loperamide    Objective:   Vitals:   T-max:  Patient Vitals for the past 8 hrs:   BP Temp Temp src Pulse Resp SpO2   11/21/20 0200 (!) 92/42 -- -- 64 17 --   11/21/20 0100 (!) 126/41 -- -- 71 20 100 %   11/21/20 0025 -- -- -- -- 25 100 %   11/21/20 0005 -- -- -- -- 21 100 %   11/21/20 0000 (!) 121/49 99.4 °F (37.4 °C) Oral 75 18 100 %       Intake/Output Summary (Last 24 hours) at 11/21/2020 8596  Last data filed at 11/20/2020 1755  Gross per 24 hour   Intake 1880 ml   Output 1220 ml   Net 660 ml       Review of Systems- Could not be obtained due to patient mentation    Physical Exam  Constitutional: Appearance: She is ill-appearing. Comments: Frail appearing. HENT:      Mouth/Throat:      Mouth: Mucous membranes are moist.   Eyes:      Pupils: Pupils are equal, round, and reactive to light. Cardiovascular:      Rate and Rhythm: Normal rate and regular rhythm. Heart sounds: No murmur. No friction rub. No gallop. Pulmonary:      Effort: Pulmonary effort is normal.      Breath sounds: Normal breath sounds. Abdominal:      General: Abdomen is flat. Bowel sounds are normal.   Skin:     General: Skin is warm. Neurological:      Mental Status: She is alert. Comments: Alert, tracks examiner. Moving extremities spontaneously. No obvious focal deficits. LABS:    CBC:   Recent Labs     11/19/20  1540 11/20/20  0209 11/21/20  0349   WBC 12.1* 10.3 7.3   HGB 11.3* 11.1* 10.2*   HCT 39.6 37.1 33.2*   PLT 89* 93* 102*   .2* 99.1 95.8     Renal:    Recent Labs     11/19/20  1540  11/20/20  2215 11/21/20  0349 11/21/20  0430      < > 136 130* 136   K 5.0   < > 3.8 8.6* 4.7      < > 101 97* 98*   CO2 17*   < > 22 22 24   BUN 84*   < > 40* 44* 43*   CREATININE 7.6*   < > 4.1* 4.6* 4.5*   GLUCOSE 97   < > 80 141* 107*   CALCIUM 7.7*   < > 8.1* 8.2* 8.2*   MG 1.70*  --   --   --   --    PHOS 8.3*  --   --  5.9* 5.9*   ANIONGAP 18*   < > 13 11 14    < > = values in this interval not displayed. Hepatic:   Recent Labs     11/19/20  1540 11/21/20  0349 11/21/20  0430   AST 19  --   --    ALT 9*  --   --    BILITOT 0.4  --   --    BILIDIR <0.2  --   --    PROT 6.7  --   --    LABALBU 3.8 3.6 3.5   ALKPHOS 109  --   --      Troponin:   Recent Labs     11/19/20  1540 11/20/20  0209 11/20/20  1004   TROPONINI 0.08* 0.08* 0.10*     BNP: No results for input(s): BNP in the last 72 hours. Lipids: No results for input(s): CHOL, HDL in the last 72 hours.     Invalid input(s): LDLCALCU, TRIGLYCERIDE  ABGs:    Recent Labs     11/19/20  1706 11/19/20  2221 11/20/20  0509   PHART 6.980* 7.039* 7.060*   VEA1WHW 87.5* 71.2* 71.7*   PO2ART 207.0* 165.0* 166.2*   FDV1LWF 21 18* 20.3*   BEART -11.9* -13.0* -10*   G9YBMFQN 96 95 99   TEM2LMS 88 21 23       INR: No results for input(s): INR in the last 72 hours. Lactate: No results for input(s): LACTATE in the last 72 hours. Cultures:  -----------------------------------------------------------------  RAD:   CT HEAD WO CONTRAST   Final Result      No acute intracranial abnormality or significant mass effect. XR CHEST PORTABLE   Final Result      Expiratory lungs with no gross evidence for acute cardiopulmonary disease            Assessment/Plan:   82 y. o. female with  COPD, adrenal insufficiency, CAD, MELISSA who presented with confusion,  fatigue.     Neuro  History of Alzheimer's  Acute metabolic encephalopathy- improving   -Continue Donezapil    - Waxing and Waning mental status per RN, no focal deficits noted on exam    Pulmonary  Acute Hypercapnic Respiratory Failure  History of COPD with wheezing on exam, likely due to COPD exacerbation. Given dose of Cefepime and vancomycin in the ED. Low suspicion for pneumonia without fever, tachycardia, and clean CXR but patient does have leukocytosis. - Weaned off BiPAP, currently on 2L NC with O2 sats of 100%  - DuoNebs   - Solumedrol 40mg Q daily   - Azithromycin   - COVID-19 Negative    [facility]     Hypotension- Improving s/p IVFs-  Could be 2/2 to adrenal insufficiency or dialysis earlier in the day. No concern for sepsis, lactate normal.   - Improved. Received 2 liters of IVF  - Cont Florineff     Elevated Troponin: Likely sec to ESRD. ECG with no evidence of ischemia. - Trend troponins      Endocrine  Adrenal Insufficiency   History of adrenal insufficiency on fludrocortisone.   - Cont Florineff as above     DM Type 2  Not on any medications at home. - Hypoglycemia protocol         Renal  ESRD on Dialysis  No acute indication for emergent dialysis.   - Nephrology consulted, likely HD today  - Daily weights, strict I/Os    GI/Fen  - Start renal diet today       Code Status:DNR-CCA  FEN: Renal diet  PPX: SQH   DISPO: Likely transfer to floors      Ted Luu MD, PGY-3  11/21/20  7:11 AM    This patient has been staffed and discussed with Dr. Lon Adrian    Patient was seen, examined and discussed with Dr. Riccardo Sadler. I agree with the interval history. My physical exam confirms the findings listed below  Chart was reviewed including labs and medical records confirm the findings noted    Combined metabolic and respiratory acidosis due to acute on chronic respiratory acidosis and metabolic acidosis believed to be due to ESRD  Hypotension - improved. Mildly elevated troponin, chronic, suspected to be due to ESRD  Mild leukocytosis - resolved  ID no evidence of active infection at this time. Dementia.       Keep off ABX  HD per nephrology   COPD exacerbation is ruled out.   D/c solu medrol and azithromycin   OK to transfer to the floor after HD  BiPAP at night

## 2020-11-21 NOTE — PLAN OF CARE
Problem: Falls - Risk of:  Goal: Will remain free from falls  Description: Will remain free from falls  Outcome: Ongoing   Bed alarm on, call bell with patient

## 2020-11-21 NOTE — PROGRESS NOTES
Patient Name: Krystina Luo                                                    Primary Physician: Marj Okeefe MD  Admitting Dx: COPD exacerbation [facility]) [J44.1]  COPD exacerbation [facility]) [J44.1]    Dr. Patricia Tripp      Nephrology [facility] - [place] Progress Note  [facility] Nephrology  [email or web address] [employer]                                       Interval Plan:     · Patient scheduled for dialysis today. Weight is 52 kg and no excessive fluid. Check outpatient TW. · Labs reviewed and lyltes are stable with K of 4.7 after repeat from 8.6 not likely accurate. · PO4 slightly elevated at 5.9 without binders but not eating much. · BP stable today 145/65. Assessment:     ESRD  - does not appear volume overloaded  - has functioning right arm graft  - K and BUN are high.   - daily RFP     Acidemia  - primarily respiratory  - there is some AGMA. Lactate was normal     Anemia  - is 11  - no need for EPO at this time     Secondary hyperparathyroidism  - continue calcitriol    Please call our office at [phone] or Perfect Serve with any questions or contact me directly. Subjective:     CC / Reason for Consult:  ESRD    HPI/PMH:  82 y.o. female presented to the hospital on 11/19/2020 with AMS. Was at outpatient dialysis. During dialysis she had altered status and hypotension with a systolic in the 18M and was becoming more somnolent and sent to the [facility] ED. Patient hypoxic and hypercapnic. Started on Bipap. No gross volume overload on CXR.        ROS / Interval Hx: Patient seen in ICU and resting in bed. AMS ad minimal verbal response    Objective:        Gen: chronically ill appearing, not responsive to questions     Neck:  supple, no significant adenopathy     Cardio: normal rate, regular rhythm, normal S1, S2, no murmurs, rubs, clicks or gallops      Resp: clear to auscultation, no wheezes, rales or rhonchi, symmetric air entry. GI:  soft, nontender, nondistended, no masses or organomegaly.       Ext: peripheral pulses normal, no pedal edema, no clubbing or cyanosis      MS: no joint tenderness, deformity or swelling      DERM: normal coloration and turgor, no rashesor bruising    Vitals:     BP (!) 145/65   Pulse 66   Temp 98 °F (36.7 °C) (Oral)   Resp 14   Ht 5' 1\" (1.549 m)   Wt 114 lb 10.2 oz (52 kg)   LMP  (LMP Unknown)   SpO2 100%   BMI 21.66 kg/m²      I/Os: Reviewed    Meds: Reviewed. Please see plan for changes made.     Labs:    Lab Results   Component Value Date    WBC 7.3 11/21/2020    HGB 10.2 11/21/2020    HCT 33.2 11/21/2020    MCV 95.8 11/21/2020     11/21/2020      Lab Results   Component Value Date    CREATININE 4.5 11/21/2020    BUN 43 11/21/2020     11/21/2020    K 4.7 11/21/2020    K 3.8 11/20/2020    CL 98 11/21/2020    CO2 24 11/21/2020     No components found for: GLU   Lab Results   Component Value Date    .8 03/16/2017    CALCIUM 8.2 11/21/2020    CAION 1.07 03/14/2017    PHOS 5.9 11/21/2020      No results found for: [id number]   Lab Results   Component Value Date    IRON 62 05/15/2015    TIBC 135 05/15/2015    FERRITIN 537 05/15/2015      No results found for: Yany Galarza

## 2020-11-22 LAB
ALBUMIN SERPL-MCNC: 3.5 G/DL (ref 3.4–5)
ANION GAP SERPL CALCULATED.3IONS-SCNC: 12 MMOL/L (ref 3–16)
BASOPHILS ABSOLUTE: 0 K/UL (ref 0–0.2)
BASOPHILS RELATIVE PERCENT: 0.3 %
BUN BLDV-MCNC: 26 MG/DL (ref 7–20)
CALCIUM SERPL-MCNC: 8.5 MG/DL (ref 8.3–10.6)
CHLORIDE BLD-SCNC: 100 MMOL/L (ref 99–110)
CO2: 25 MMOL/L (ref 21–32)
CREAT SERPL-MCNC: 3.4 MG/DL (ref 0.6–1.2)
EOSINOPHILS ABSOLUTE: 0 K/UL (ref 0–0.6)
EOSINOPHILS RELATIVE PERCENT: 0.2 %
GFR AFRICAN AMERICAN: 16
GFR NON-AFRICAN AMERICAN: 13
GLUCOSE BLD-MCNC: 93 MG/DL (ref 70–99)
HCT VFR BLD CALC: 31.1 % (ref 36–48)
HEMOGLOBIN: 9.5 G/DL (ref 12–16)
LYMPHOCYTES ABSOLUTE: 0.8 K/UL (ref 1–5.1)
LYMPHOCYTES RELATIVE PERCENT: 17.3 %
MCH RBC QN AUTO: 29.5 PG (ref 26–34)
MCHC RBC AUTO-ENTMCNC: 30.5 G/DL (ref 31–36)
MCV RBC AUTO: 97 FL (ref 80–100)
MONOCYTES ABSOLUTE: 0.9 K/UL (ref 0–1.3)
MONOCYTES RELATIVE PERCENT: 20.4 %
NEUTROPHILS ABSOLUTE: 2.8 K/UL (ref 1.7–7.7)
NEUTROPHILS RELATIVE PERCENT: 61.8 %
PDW BLD-RTO: 16.2 % (ref 12.4–15.4)
PHOSPHORUS: 3.8 MG/DL (ref 2.5–4.9)
PLATELET # BLD: 97 K/UL (ref 135–450)
PMV BLD AUTO: 9.2 FL (ref 5–10.5)
POTASSIUM SERPL-SCNC: 3.7 MMOL/L (ref 3.5–5.1)
RBC # BLD: 3.21 M/UL (ref 4–5.2)
SODIUM BLD-SCNC: 137 MMOL/L (ref 136–145)
WBC # BLD: 4.6 K/UL (ref 4–11)

## 2020-11-22 PROCEDURE — 6360000002 HC RX W HCPCS: Performed by: STUDENT IN AN ORGANIZED HEALTH CARE EDUCATION/TRAINING PROGRAM

## 2020-11-22 PROCEDURE — 1200000000 HC SEMI PRIVATE

## 2020-11-22 PROCEDURE — 94761 N-INVAS EAR/PLS OXIMETRY MLT: CPT

## 2020-11-22 PROCEDURE — 80069 RENAL FUNCTION PANEL: CPT

## 2020-11-22 PROCEDURE — 6370000000 HC RX 637 (ALT 250 FOR IP): Performed by: STUDENT IN AN ORGANIZED HEALTH CARE EDUCATION/TRAINING PROGRAM

## 2020-11-22 PROCEDURE — 2580000003 HC RX 258: Performed by: STUDENT IN AN ORGANIZED HEALTH CARE EDUCATION/TRAINING PROGRAM

## 2020-11-22 PROCEDURE — 36415 COLL VENOUS BLD VENIPUNCTURE: CPT

## 2020-11-22 PROCEDURE — 94660 CPAP INITIATION&MGMT: CPT

## 2020-11-22 PROCEDURE — 6370000000 HC RX 637 (ALT 250 FOR IP): Performed by: INTERNAL MEDICINE

## 2020-11-22 PROCEDURE — 85025 COMPLETE CBC W/AUTO DIFF WBC: CPT

## 2020-11-22 PROCEDURE — 2700000000 HC OXYGEN THERAPY PER DAY

## 2020-11-22 PROCEDURE — 94640 AIRWAY INHALATION TREATMENT: CPT

## 2020-11-22 RX ADMIN — Medication 10 ML: at 21:43

## 2020-11-22 RX ADMIN — ATORVASTATIN CALCIUM 10 MG: 10 TABLET, FILM COATED ORAL at 21:19

## 2020-11-22 RX ADMIN — CALCITRIOL CAPSULES 0.25 MCG 0.25 MCG: 0.25 CAPSULE ORAL at 09:07

## 2020-11-22 RX ADMIN — LEVOTHYROXINE SODIUM 100 MCG: 100 TABLET ORAL at 07:10

## 2020-11-22 RX ADMIN — HEPARIN SODIUM 5000 UNITS: 5000 INJECTION INTRAVENOUS; SUBCUTANEOUS at 07:10

## 2020-11-22 RX ADMIN — HEPARIN SODIUM 5000 UNITS: 5000 INJECTION INTRAVENOUS; SUBCUTANEOUS at 21:19

## 2020-11-22 RX ADMIN — BUDESONIDE INHALATION 500 MCG: 0.5 SUSPENSION RESPIRATORY (INHALATION) at 11:15

## 2020-11-22 RX ADMIN — IPRATROPIUM BROMIDE AND ALBUTEROL SULFATE 1 AMPULE: .5; 3 SOLUTION RESPIRATORY (INHALATION) at 23:51

## 2020-11-22 RX ADMIN — ARFORMOTEROL TARTRATE 15 MCG: 15 SOLUTION RESPIRATORY (INHALATION) at 23:57

## 2020-11-22 RX ADMIN — FLUDROCORTISONE ACETATE 0.1 MG: 0.1 TABLET ORAL at 09:07

## 2020-11-22 RX ADMIN — DONEPEZIL HYDROCHLORIDE 10 MG: 10 TABLET, FILM COATED ORAL at 21:19

## 2020-11-22 RX ADMIN — BUDESONIDE INHALATION 500 MCG: 0.5 SUSPENSION RESPIRATORY (INHALATION) at 23:52

## 2020-11-22 RX ADMIN — FLUDROCORTISONE ACETATE 0.1 MG: 0.1 TABLET ORAL at 21:19

## 2020-11-22 RX ADMIN — IPRATROPIUM BROMIDE AND ALBUTEROL SULFATE 1 AMPULE: .5; 3 SOLUTION RESPIRATORY (INHALATION) at 11:14

## 2020-11-22 RX ADMIN — HEPARIN SODIUM 5000 UNITS: 5000 INJECTION INTRAVENOUS; SUBCUTANEOUS at 14:39

## 2020-11-22 RX ADMIN — ARFORMOTEROL TARTRATE 15 MCG: 15 SOLUTION RESPIRATORY (INHALATION) at 11:15

## 2020-11-22 RX ADMIN — IPRATROPIUM BROMIDE AND ALBUTEROL SULFATE 1 AMPULE: .5; 3 SOLUTION RESPIRATORY (INHALATION) at 15:57

## 2020-11-22 RX ADMIN — Medication 10 ML: at 09:08

## 2020-11-22 RX ADMIN — CITALOPRAM HYDROBROMIDE 10 MG: 10 TABLET ORAL at 21:19

## 2020-11-22 ASSESSMENT — PAIN SCALES - GENERAL: PAINLEVEL_OUTOF10: 4

## 2020-11-22 NOTE — PROGRESS NOTES
4 Eyes Skin Assessment     The patient is being assess for   {Blank single:28721::\"Admission\",\"Transfer to New Unit\",\"Post-Op Surgical\",\"Cath Lab Post-Op\",\"Shift Handoff\"}    I agree that 2 RN's have performed a thorough Head to Toe Skin Assessment on the patient. ALL assessment sites listed below have been assessed.       Areas assessed by both nurses:   [x]   Head, Face, and Ears   [x]   Shoulders, Back, and Chest, Abdomen  [x]   Arms, Elbows, and Hands   [x]   Coccyx, Sacrum, and Ischium  [x]   Legs, Feet, and Heels        ****    **SHARE this note so that the co-signing nurse is able to place an eSignature**    Co-signer eSignature: {Esignature:[id number]}    Does the Patient have Skin Breakdown?  no         Esau Prevention initiated: yes  Wound Care Orders initiated: np      WOC nurse consulted for Pressure Injury (Stage 3,4, Unstageable, DTI, NWPT, Complex wounds)and New or Established Ostomies:  np    Primary Nurse eSignature: Anastasiia Fairchild

## 2020-11-22 NOTE — PROGRESS NOTES
4 Eyes Admission Assessment     I agree as the admission nurse that 2 RN's have performed a thorough Head to Toe Skin Assessment on the patient. ALL assessment sites listed below have been assessed on admission. Areas assessed by both nurses:   [x]   Head, Face, and Ears   [x]   Shoulders, Back, and Chest  [x]   Arms, Elbows, and Hands   [x]   Coccyx, Sacrum, and Ischum  [x]   Legs, Feet, and Heels        Does the Patient have Skin Breakdown? The patient has a stage two pressure sore on the left outer groin as well as a stage two pressure sore on the inner groin. Both are noted on the patient's [address] [place] [address] avatar. She has a pressure sore on her coccyx and posterior thigh as well. The kourtney area is overall red and excoriated. The patient's heels are scabbed and tender, however, not open wounds. There is no drainage noted from any of the sites. Zinc paste has been applied to the kourtney area.         Esau Prevention initiated:  Yes   Wound Care Orders initiated:  Yes      [address]  nurse consulted for Pressure Injury (Stage 3,4, Unstageable, DTI, NWPT, and Complex wounds):  No      Nurse 1 eSignature: Electronically signed by Jessica Sims RN on 11/22/20 at 5:36 PM EST    **SHARE this note so that the co-signing nurse is able to place an eSignature**    Nurse 2 eSignature: Electronically signed by Twin Holland RN on 11/22/20 at 7:03 PM EST

## 2020-11-22 NOTE — PROGRESS NOTES
Progress Note  Admit Date: 11/19/2020            82 y.o. female with  COPD, adrenal insufficiency, CAD, MELISSA who presented with confusion,  fatigue.       She originally went to [facility] ED where she was told to go to outpatient dialysis. During dialysis she had altered status and hypotension with a systolic in the 84U and was becoming more somnolent and sent to the [facility] ED. When I saw the patient she was awake and alert. She say she has felt tired and confused today but was alert and oriented x3. She denied any fever, chills, cough, chest pain, SOB, abdominal pain, nausea, vomitting, or diarrhea.     In the ED lab work was afebrile, normal RR, not tachycardic, and hypotensive to 77/34 and received 500 ml of fluid. Labs showed K of 5, Cr 7.6, AG 18, Mg 1.7, BUN 84, phos 8.3, glucose 97, troponin .08, WBC 12.1, HG 11.3, and ABG showed respiratory acidosis with pH 6.98, pCO2 87.5. She was started on BiPAP in the ED. CXR showed expiratory lungs with no gross evidence for acute disease. No new complaints today  No chest pain        Review of Systems - Negative apart from as in the HPI above.       Scheduled Medications:    budesonide  0.5 mg Nebulization BID    Arformoterol Tartrate  15 mcg Nebulization BID    atorvastatin  10 mg Oral Nightly    calcitRIOL  0.25 mcg Oral Every Other Day    citalopram  10 mg Oral Nightly    donepezil  10 mg Oral Nightly    fludrocortisone  0.1 mg Oral BID    levothyroxine  100 mcg Oral Daily    sodium chloride flush  10 mL Intravenous 2 times per day    heparin (porcine)  5,000 Units Subcutaneous 3 times per day    cosyntropin  250 mcg Intravenous Once    ipratropium-albuterol  1 ampule Inhalation 4x daily      PRN Medications: sodium chloride flush, acetaminophen **OR** acetaminophen, polyethylene glycol, promethazine **OR** ondansetron, glucose, dextrose, glucagon (rDNA), dextrose, ipratropium-albuterol, loperamide  Diet: DIET RENAL; Dysphagia Minced and Moist; Daily Fluid Restriction: 1200 ml    Continuous Infusions:   dextrose         PHYSICAL EXAM:  BP (!) 151/60   Pulse 61   Temp 97.9 °F (36.6 °C) (Oral)   Resp 20   Ht 5' 1\" (1.549 m)   Wt 112 lb 14 oz (51.2 kg)   LMP  (LMP Unknown)   SpO2 98%   BMI 21.33 kg/m²   Recent Labs     11/19/20  1445 11/20/20  1335 11/21/20  1153 11/21/20  1721   POCGLU 86 168* 114* 100*       Intake/Output Summary (Last 24 hours) at 11/22/2020 1240  Last data filed at 11/22/2020 1000  Gross per 24 hour   Intake 470 ml   Output 0 ml   Net 470 ml       Constitutional:       General: She is not in acute distress, ill-appearing. HENT: BiPAP in place   Cardiovascular:      Rate and Rhythm: Normal rate and regular rhythm. Heart sounds: No murmur. No friction rub. No gallop. Pulmonary:      Breath sounds: diminished bilaterally  Abdominal:  There is no distension. There is no abdominal tenderness. Musculoskeletal:     Right lower leg: No edema. Left lower leg: No edema. Neurological: Grossly non-focal        LABS:  Recent Labs     11/20/20  0209 11/21/20  0349 11/22/20  0518   WBC 10.3 7.3 4.6   HGB 11.1* 10.2* 9.5*   HCT 37.1 33.2* 31.1*   PLT 93* 102* 97*                                                                    Recent Labs     11/21/20  0349 11/21/20  0430 11/22/20  0518   * 136 137   K 8.6* 4.7 3.7   CL 97* 98* 100   CO2 22 24 25   BUN 44* 43* 26*   CREATININE 4.6* 4.5* 3.4*   GLUCOSE 141* 107* 93     Recent Labs     11/19/20  1540   AST 19   ALT 9*   BILITOT 0.4   ALKPHOS 109     Recent Labs     11/19/20  1540 11/20/20  0209 11/20/20  1004   TROPONINI 0.08* 0.08* 0.10*     No results for input(s): BNP in the last 72 hours. No results for input(s): CHOL, HDL in the last 72 hours. Invalid input(s): LDLCALCU  No results for input(s): INR in the last 72 hours. Assessment & Plan:     82 y.o. female with  COPD, adrenal insufficiency, CAD, MELISSA who presented with confusion,  fatigue.       Acute Hypercapnic Respiratory Failure  Acute metabolic encephalopathy  Combined metabolic and respiratory acidosis due to acute on chronic respiratory acidosis and metabolic acidosis believed to be due to ESRD     Patient somnolent on presentation    Started on BiPAP in the ED. History of COPD with wheezing on exam, was felt likely due to COPD exacerbation. Given dose of Cefepime and vancomycin in the ED. Low suspicion for pneumonia without fever, tachycardia, and clean CXR but patient does have leukocytosis. Further antibx held. - Pulmonary does not think COPD exacerbation. Azithro and Steroids discontinued. - Weaned off BiPAP  - DuoNebs   - Keep off antibx except more convincing indication identified.   - Follow cx data  - COVID-19 negative       Hypotension  Patient with hypotension in Dialysis and initial BP of 77/34 in the ED    Received 500 mL in the ED. BP improved with 500ml to 104/80. Was felt on admission, could be 2/2 to adrenal insufficiency or dialysis earlier in the day. No concern for sepsis, lactate normal.   - Improved with IV fluid hydration.  - Monitor       Adrenal Insufficiency   History of adrenal insufficiency on fludrocortisone.         Elevated Troponin: Likely sec to ESRD. ECG with no evidence of ischemia. - Trended troponins : appears at baseline. ESRD on Dialysis  - Nephrology consulted: HD       DM Type 2  Not on any medications at home. Monitor BGs with AM labs  Encourage PO       Alzheimer  Continue Donezapil             DNR-CCA       Disposition:   PT/OT eve to guide disposition.   SLP eve Tobias MD

## 2020-11-22 NOTE — PROGRESS NOTES
Patient arrived to the unit from the ICU via hospital bed with transport. Patient oriented to self. Patient came with all belongings and Bipap. No fluids running at this time. Admission assessment complete. Patient currently in bed, locked, in the lowest position with the alarm on. x3 bed rails up. Bed side table, call light, phone, and belongings all within reach. VSS. Will continue to monitor and reassess.

## 2020-11-22 NOTE — PLAN OF CARE
Problem: Falls - Risk of:  Goal: Will remain free from falls  Description: Will remain free from falls  11/22/2020 0208 by Guera Shipman RN  Outcome: Ongoing  No falls this shift, bed in lowest position, bed alarm on, non skid socks on, call light within reach, hourly checks, safety maintained, will continue to monitor. Problem: Falls - Risk of:  Goal: Absence of physical injury  Description: Absence of physical injury  11/22/2020 0208 by Guera Shipman RN  Outcome: Ongoing     Problem: Skin Integrity:  Goal: Will show no infection signs and symptoms  Description: Will show no infection signs and symptoms  11/22/2020 0208 by Guera Shipman RN  Outcome: Ongoing  Pt is turned and repositioned every 2 hours and barrier cream wipes utilized with kourtney care.        Problem: Skin Integrity:  Goal: Absence of new skin breakdown  Description: Absence of new skin breakdown  11/22/2020 0208 by Guera Shipman RN  Outcome: Ongoing     Problem: Skin Integrity:  Goal: Absence of new skin breakdown  Description: Absence of new skin breakdown  11/22/2020 0208 by Guera Shipman RN  Outcome: Ongoing

## 2020-11-22 NOTE — PLAN OF CARE
Problem: Falls - Risk of:  Goal: Will remain free from falls  Description: Will remain free from falls  Outcome: Ongoing  Note:  Patient at risk for falls. Bed locked in lowest position with alarm on. Non-skid footwear in place. 3/4 bed rails up. Bed side table and call light within reach. All personal belongings within reach. Patient instructed to call for assistance prior to getting up.

## 2020-11-22 NOTE — PROGRESS NOTES
Pt has had 2 loose bm's this shift. Attempted to give immodium, pt refused.     Electronically signed by Fort Laram[place] Gro[place] RN on 11/22/20 at 12:20 AM EST

## 2020-11-22 NOTE — PROGRESS NOTES
Pt completed 3 hours of hemodialysis and removed 2 liters of fluid without difficulty. Right arm graft performed well and sites covered with gauze and tape post treatment. + Bruit/thrill.        11/21/20 1630 11/21/20 2012   Vital Signs   Temp 98.1 °F (36.7 °C) 97.8 °F (36.6 °C)   Temp Source  --  Oral   Pulse 70 72   Resp 16 12   BP (!) 161/70 (!) 142/53   Height and Weight   Weight 115 lb 8.3 oz (52.4 kg) 112 lb 14 oz (51.2 kg)   Weight Method Bed scale Bed scale [General Appearance - Well Developed] : well developed [Normal Appearance] : normal appearance [Well Groomed] : well groomed [General Appearance - Well Nourished] : well nourished [No Deformities] : no deformities [General Appearance - In No Acute Distress] : no acute distress [Normal Conjunctiva] : the conjunctiva exhibited no abnormalities [Eyelids - No Xanthelasma] : the eyelids demonstrated no xanthelasmas [Normal Oral Mucosa] : normal oral mucosa [No Oral Pallor] : no oral pallor [No Oral Cyanosis] : no oral cyanosis [Normal Jugular Venous A Waves Present] : normal jugular venous A waves present [Normal Jugular Venous V Waves Present] : normal jugular venous V waves present [No Jugular Venous Godwin A Waves] : no jugular venous godwin A waves [Respiration, Rhythm And Depth] : normal respiratory rhythm and effort [Exaggerated Use Of Accessory Muscles For Inspiration] : no accessory muscle use [Auscultation Breath Sounds / Voice Sounds] : lungs were clear to auscultation bilaterally [Abdomen Soft] : soft [Abdomen Tenderness] : non-tender [Abdomen Mass (___ Cm)] : no abdominal mass palpated [Abnormal Walk] : normal gait [Gait - Sufficient For Exercise Testing] : the gait was sufficient for exercise testing [Nail Clubbing] : no clubbing of the fingernails [Cyanosis, Localized] : no localized cyanosis [Petechial Hemorrhages (___cm)] : no petechial hemorrhages [Skin Color & Pigmentation] : normal skin color and pigmentation [] : no rash [No Venous Stasis] : no venous stasis [Skin Lesions] : no skin lesions [No Skin Ulcers] : no skin ulcer [No Xanthoma] : no  xanthoma was observed [Oriented To Time, Place, And Person] : oriented to person, place, and time [Affect] : the affect was normal [Mood] : the mood was normal [No Anxiety] : not feeling anxious [Normal] : normal [Normal Rate] : normal [Rhythm Regular] : regular [Normal S1] : normal S1 [Normal S2] : normal S2 [No Gallop] : no gallop heard [No Murmur] : no murmurs heard [II] : a grade 2 [Right Carotid Bruit] : right carotid bruit heard [Left Carotid Bruit] : left carotid bruit heard [___ +] : bilateral [unfilled]U+ pretibial pitting edema [S3] : no S3 [S4] : no S4 [Right Femoral Bruit] : no bruit heard over the right femoral artery [Left Femoral Bruit] : no bruit heard over the left femoral artery

## 2020-11-22 NOTE — PROGRESS NOTES
Called report to stepdown RN via Leanor Bence daughter is aware of transfer and room number    1150 transfer pt via bed with all belongings

## 2020-11-22 NOTE — PROGRESS NOTES
Patient Name: Nehal Nurse                                                    Primary Physician: Jossie Redman MD  Admitting Dx: COPD exacerbation [facility]) [J44.1]  COPD exacerbation [facility]) [J44.1]    Dr. Alcides Hoff      [facility]  [facility] Nephrology  [facility] [place]                                       Interval Plan:     · Patient had dialysis yesterday. Weight is 52 kg and no excessive fluid. UF 2 L yesterday with post weight 51.2 kg. · Labs reviewed and lyltes are stable with K of  3.7 . · PO4 3.8 without binders but not eating much. · BP stable today 137/52. No changes. Assessment:     ESRD  - does not appear volume overloaded  - has functioning right arm graft  - K and BUN are high.   - daily RFP     Acidemia  - primarily respiratory  - there is some AGMA. Lactate was normal     Anemia  - is 11  - no need for EPO at this time     Secondary hyperparathyroidism  - continue calcitriol    Please call our office at [phone] or [employer] with any questions or contact me directly. Subjective:     CC / Reason for Consult:  ESRD    HPI/PMH:  82 y.o. female presented to the hospital on 11/19/2020 with AMS. Was at outpatient dialysis. During dialysis she had altered status and hypotension with a systolic in the 38Q and was becoming more somnolent and sent to the [address] [place] [address] ED. Patient hypoxic and hypercapnic. Started on Bipap. No gross volume overload on CXR.        ROS / Interval Hx: Patient seen in ICU and resting in bed. AMS ad minimal verbal response    Objective:        Gen: chronically ill appearing, not responsive to questions     Neck:  supple, no significant adenopathy     Cardio: normal rate, regular rhythm, normal S1, S2, no murmurs, rubs, clicks or gallops      Resp: clear to auscultation, no wheezes, rales or rhonchi, symmetric air entry. GI:  soft, nontender, nondistended, no masses or organomegaly.       Ext:  peripheral pulses normal, no pedal edema, no clubbing or cyanosis      MS: no joint tenderness, deformity or swelling      DERM: normal coloration and turgor, no rashesor bruising    Vitals:     BP (!) 137/52   Pulse 63   Temp 97.4 °F (36.3 °C) (Oral)   Resp 14   Ht 5' 1\" (1.549 m)   Wt 112 lb 14 oz (51.2 kg)   LMP  (LMP Unknown)   SpO2 100%   BMI 21.33 kg/m²      I/Os: Reviewed    Meds: Reviewed. Please see plan for changes made.     Labs:    Lab Results   Component Value Date    WBC 4.6 11/22/2020    HGB 9.5 11/22/2020    HCT 31.1 11/22/2020    MCV 97.0 11/22/2020    PLT 97 11/22/2020      Lab Results   Component Value Date    CREATININE 3.4 11/22/2020    BUN 26 11/22/2020     11/22/2020    K 3.7 11/22/2020    K 3.8 11/20/2020     11/22/2020    CO2 25 11/22/2020     No components found for: GLU   Lab Results   Component Value Date    .8 03/16/2017    CALCIUM 8.5 11/22/2020    CAION 1.07 03/14/2017    PHOS 3.8 11/22/2020      No results found for: [id number]   Lab Results   Component Value Date    IRON 62 05/15/2015    TIBC 135 05/15/2015    FERRITIN 537 05/15/2015      No results found for: Yakelin Carpenterh

## 2020-11-23 LAB
ALBUMIN SERPL-MCNC: 3.6 G/DL (ref 3.4–5)
ANION GAP SERPL CALCULATED.3IONS-SCNC: 12 MMOL/L (ref 3–16)
BASOPHILS ABSOLUTE: 0 K/UL (ref 0–0.2)
BASOPHILS RELATIVE PERCENT: 0.6 %
BLOOD CULTURE, ROUTINE: NORMAL
BUN BLDV-MCNC: 37 MG/DL (ref 7–20)
CALCIUM SERPL-MCNC: 8.6 MG/DL (ref 8.3–10.6)
CHLORIDE BLD-SCNC: 97 MMOL/L (ref 99–110)
CO2: 24 MMOL/L (ref 21–32)
CREAT SERPL-MCNC: 4.8 MG/DL (ref 0.6–1.2)
CULTURE, BLOOD 2: NORMAL
EOSINOPHILS ABSOLUTE: 0 K/UL (ref 0–0.6)
EOSINOPHILS RELATIVE PERCENT: 0.9 %
GFR AFRICAN AMERICAN: 10
GFR NON-AFRICAN AMERICAN: 9
GLUCOSE BLD-MCNC: 110 MG/DL (ref 70–99)
GLUCOSE BLD-MCNC: 175 MG/DL (ref 70–99)
GLUCOSE BLD-MCNC: 69 MG/DL (ref 70–99)
GLUCOSE BLD-MCNC: 88 MG/DL (ref 70–99)
HCT VFR BLD CALC: 31.9 % (ref 36–48)
HEMOGLOBIN: 9.7 G/DL (ref 12–16)
LYMPHOCYTES ABSOLUTE: 1 K/UL (ref 1–5.1)
LYMPHOCYTES RELATIVE PERCENT: 21 %
MCH RBC QN AUTO: 29.2 PG (ref 26–34)
MCHC RBC AUTO-ENTMCNC: 30.3 G/DL (ref 31–36)
MCV RBC AUTO: 96.4 FL (ref 80–100)
MONOCYTES ABSOLUTE: 0.8 K/UL (ref 0–1.3)
MONOCYTES RELATIVE PERCENT: 16.8 %
NEUTROPHILS ABSOLUTE: 2.9 K/UL (ref 1.7–7.7)
NEUTROPHILS RELATIVE PERCENT: 60.7 %
PDW BLD-RTO: 16 % (ref 12.4–15.4)
PERFORMED ON: ABNORMAL
PHOSPHORUS: 4.9 MG/DL (ref 2.5–4.9)
PLATELET # BLD: 100 K/UL (ref 135–450)
PMV BLD AUTO: 8.3 FL (ref 5–10.5)
POTASSIUM SERPL-SCNC: 3.3 MMOL/L (ref 3.5–5.1)
RBC # BLD: 3.31 M/UL (ref 4–5.2)
SODIUM BLD-SCNC: 133 MMOL/L (ref 136–145)
WBC # BLD: 4.8 K/UL (ref 4–11)

## 2020-11-23 PROCEDURE — 6360000002 HC RX W HCPCS: Performed by: STUDENT IN AN ORGANIZED HEALTH CARE EDUCATION/TRAINING PROGRAM

## 2020-11-23 PROCEDURE — 97530 THERAPEUTIC ACTIVITIES: CPT

## 2020-11-23 PROCEDURE — 94640 AIRWAY INHALATION TREATMENT: CPT

## 2020-11-23 PROCEDURE — 97166 OT EVAL MOD COMPLEX 45 MIN: CPT

## 2020-11-23 PROCEDURE — 80069 RENAL FUNCTION PANEL: CPT

## 2020-11-23 PROCEDURE — 6360000002 HC RX W HCPCS: Performed by: INTERNAL MEDICINE

## 2020-11-23 PROCEDURE — 1200000000 HC SEMI PRIVATE

## 2020-11-23 PROCEDURE — 90935 HEMODIALYSIS ONE EVALUATION: CPT

## 2020-11-23 PROCEDURE — 85025 COMPLETE CBC W/AUTO DIFF WBC: CPT

## 2020-11-23 PROCEDURE — 97535 SELF CARE MNGMENT TRAINING: CPT

## 2020-11-23 PROCEDURE — 2580000003 HC RX 258: Performed by: STUDENT IN AN ORGANIZED HEALTH CARE EDUCATION/TRAINING PROGRAM

## 2020-11-23 PROCEDURE — 6370000000 HC RX 637 (ALT 250 FOR IP): Performed by: STUDENT IN AN ORGANIZED HEALTH CARE EDUCATION/TRAINING PROGRAM

## 2020-11-23 PROCEDURE — 97163 PT EVAL HIGH COMPLEX 45 MIN: CPT

## 2020-11-23 PROCEDURE — 94761 N-INVAS EAR/PLS OXIMETRY MLT: CPT

## 2020-11-23 PROCEDURE — 36415 COLL VENOUS BLD VENIPUNCTURE: CPT

## 2020-11-23 PROCEDURE — 2700000000 HC OXYGEN THERAPY PER DAY

## 2020-11-23 PROCEDURE — 94660 CPAP INITIATION&MGMT: CPT

## 2020-11-23 RX ORDER — IPRATROPIUM BROMIDE AND ALBUTEROL SULFATE 2.5; .5 MG/3ML; MG/3ML
1 SOLUTION RESPIRATORY (INHALATION) EVERY 4 HOURS PRN
Status: DISCONTINUED | OUTPATIENT
Start: 2020-11-23 | End: 2020-11-24 | Stop reason: HOSPADM

## 2020-11-23 RX ADMIN — HEPARIN SODIUM 5000 UNITS: 5000 INJECTION INTRAVENOUS; SUBCUTANEOUS at 06:21

## 2020-11-23 RX ADMIN — DONEPEZIL HYDROCHLORIDE 10 MG: 10 TABLET, FILM COATED ORAL at 22:36

## 2020-11-23 RX ADMIN — HEPARIN SODIUM 5000 UNITS: 5000 INJECTION INTRAVENOUS; SUBCUTANEOUS at 16:01

## 2020-11-23 RX ADMIN — Medication 10 ML: at 11:38

## 2020-11-23 RX ADMIN — DARBEPOETIN ALFA 60 MCG: 60 INJECTION, SOLUTION INTRAVENOUS; SUBCUTANEOUS at 09:54

## 2020-11-23 RX ADMIN — FLUDROCORTISONE ACETATE 0.1 MG: 0.1 TABLET ORAL at 22:36

## 2020-11-23 RX ADMIN — CITALOPRAM HYDROBROMIDE 10 MG: 10 TABLET ORAL at 22:37

## 2020-11-23 RX ADMIN — LEVOTHYROXINE SODIUM 100 MCG: 100 TABLET ORAL at 06:22

## 2020-11-23 RX ADMIN — BUDESONIDE INHALATION 500 MCG: 0.5 SUSPENSION RESPIRATORY (INHALATION) at 20:15

## 2020-11-23 RX ADMIN — ATORVASTATIN CALCIUM 10 MG: 10 TABLET, FILM COATED ORAL at 22:36

## 2020-11-23 RX ADMIN — Medication 10 ML: at 23:01

## 2020-11-23 RX ADMIN — HEPARIN SODIUM 5000 UNITS: 5000 INJECTION INTRAVENOUS; SUBCUTANEOUS at 22:37

## 2020-11-23 RX ADMIN — ARFORMOTEROL TARTRATE 15 MCG: 15 SOLUTION RESPIRATORY (INHALATION) at 20:15

## 2020-11-23 RX ADMIN — FLUDROCORTISONE ACETATE 0.1 MG: 0.1 TABLET ORAL at 11:37

## 2020-11-23 ASSESSMENT — PAIN SCALES - GENERAL
PAINLEVEL_OUTOF10: 0

## 2020-11-23 NOTE — FLOWSHEET NOTE
Treatment time: 3 hours    Net UF: 1.5 liters     Pre weight: 49.8 kg   Post weight: 48.4 kg   EDW: tbd    Access used: R upper AVG  Access function: good     Medications or blood products given: Aranesp 60 mcg    Regular outpatient schedule: [address] [facility]     Summary of response to treatment: 1.5 liters removed. Aranesp 60 mcg given. Pt tolerated tx well. Post VSS.  Report to primary nurse Cara Rojo RN    Copy of dialysis treatment record placed in chart, to be scanned into EMR.     11/23/20 0721 11/23/20 1025   Vital Signs   /64 137/75   Temp 97.6 °F (36.4 °C) 97.4 °F (36.3 °C)   Pulse 60 61   Weight 109 lb 12.6 oz (49.8 kg) 106 lb 11.2 oz (48.4 kg)   Weight Method Bed scale Bed scale   Post-Hemodialysis Assessment   Hemodialysis Intake (ml)  --  300 ml   Hemodialysis Output (ml)  --  1800 ml   NET Removed (ml)  --  1500 ml   Tolerated Treatment  --  Good

## 2020-11-23 NOTE — PROGRESS NOTES
Hospitalist Progress Note      PCP: Kiara Boykin MD    Date of Admission: 11/19/2020    CC confusion, fatigue  Hospital course  Patient is 65-year-old female with history of COPD, renal sufficiency, CAD, MELISSA on BiPAP presented with confusion and fatigue. Patient was initially sent to [facility] ED. Was told to go to outpatient dialysis during dialysis patient was noted to be altered mental status and hypotensive. In ER patient was noted to be hypotensive, hypercapnic respiratory failure requiring BiPAP. Patient was treated for possible COPD exacerbation but the steroids antibiotics were stopped after COPD was ruled out. Subjective  Patient was seen and examined today in dialysis unit. Denies any nausea, vomiting, fever, chills or shortness of breath. No acute event reported overnight.     Medications:  Reviewed    Infusion Medications    dextrose       Scheduled Medications    darbepoetin milady-polysorbate  60 mcg Subcutaneous Weekly    budesonide  0.5 mg Nebulization BID    Arformoterol Tartrate  15 mcg Nebulization BID    atorvastatin  10 mg Oral Nightly    calcitRIOL  0.25 mcg Oral Every Other Day    citalopram  10 mg Oral Nightly    donepezil  10 mg Oral Nightly    fludrocortisone  0.1 mg Oral BID    levothyroxine  100 mcg Oral Daily    sodium chloride flush  10 mL Intravenous 2 times per day    heparin (porcine)  5,000 Units Subcutaneous 3 times per day    cosyntropin  250 mcg Intravenous Once     PRN Meds: ipratropium-albuterol, sodium chloride flush, acetaminophen **OR** acetaminophen, polyethylene glycol, promethazine **OR** ondansetron, glucose, dextrose, glucagon (rDNA), dextrose, ipratropium-albuterol, loperamide      Intake/Output Summary (Last 24 hours) at 11/23/2020 1520  Last data filed at 11/23/2020 1449  Gross per 24 hour   Intake 720 ml   Output 1800 ml   Net -1080 ml       Physical Exam Performed:    BP (!) 138/58   Pulse 64   Temp 98.2 °F (36.8 °C) (Oral)   Resp 18 Ht 5' 1\" (1.549 m)   Wt 106 lb 11.2 oz (48.4 kg)   LMP  (LMP Unknown)   SpO2 96%   BMI 20.16 kg/m²     General appearance: No apparent distress, appears stated age and cooperative. HEENT: Pupils equal, round, and reactive to light. Conjunctivae/corneas clear. Neck: Supple, with full range of motion. No jugular venous distention. Trachea midline. Respiratory:  Normal respiratory effort. Clear to auscultation, bilaterally without Rales/Wheezes/Rhonchi. Cardiovascular: Regular rate and rhythm with normal S1/S2 without murmurs, rubs or gallops. Abdomen: Soft, non-tender, non-distended with normal bowel sounds. Musculoskeletal: Full range of motion without deformity. Neurologic:  Neurovascularly intact without any focal sensory/motor deficits. Cranial nerves: II-XII intact, grossly non-focal.  Psychiatric: Alert and oriented,   Capillary Refill: Brisk,< 3 seconds   Peripheral Pulses: +2 palpable, equal bilaterally       Labs:   Recent Labs     11/21/20  0349 11/22/20  0518 11/23/20  0647   WBC 7.3 4.6 4.8   HGB 10.2* 9.5* 9.7*   HCT 33.2* 31.1* 31.9*   * 97* 100*     Recent Labs     11/21/20  0430 11/22/20  0518 11/23/20  0647    137 133*   K 4.7 3.7 3.3*   CL 98* 100 97*   CO2 24 25 24   BUN 43* 26* 37*   CREATININE 4.5* 3.4* 4.8*   CALCIUM 8.2* 8.5 8.6   PHOS 5.9* 3.8 4.9     No results for input(s): AST, ALT, BILIDIR, BILITOT, ALKPHOS in the last 72 hours. No results for input(s): INR in the last 72 hours. No results for input(s): Nancy Josefina in the last 72 hours. Urinalysis:      Lab Results   Component Value Date    NITRU Negative 07/31/2015    WBCUA 0-2 07/31/2015    BACTERIA 4+ 05/15/2015    RBCUA None seen 07/31/2015    BLOODU Negative 07/31/2015    SPECGRAV <=1.005 07/31/2015    GLUCOSEU Negative 07/31/2015       Radiology:  CT HEAD WO CONTRAST   Final Result      No acute intracranial abnormality or significant mass effect.       XR CHEST PORTABLE   Final Result Expiratory lungs with no gross evidence for acute cardiopulmonary disease              Assessment/Plan:    Active Hospital Problems    Diagnosis Date Noted    COPD exacerbation ([facility] [address] [J44.1] 11/19/2020     Assessment and plan  #Acute on chronic hypoxic hypercapnic respiratory failure  Required transition BiPAP  Nocturnal oxygen BiPAP. #Acute metabolic encephalopathy likely secondary to hypercapnia resolved  Patient is at her baseline. #Hypertension with history of renal insufficiency  Blood pressure stable continue Florinef. #Elevated troponin likely secondary to ESRD    #ESRD on HD    #Alzheimer's  Continue donepezil. #MELISSA on nocturnal BiPAP  Discussed about with daughter patient is to follow-up with pulmonary as an outpatient. #Hypothyroid  Continue Synthroid. Discussed with daughter over the phone. Will have PT/OT evaluation prior to discharge. DVT Prophylaxis: Heparin  Diet: DIET RENAL; Dysphagia Minced and Moist; Daily Fluid Restriction: 1200 ml  Code Status: DNR-CCA    PT/OT Eval Status: consulted. Dispo - pending PT/OT eval prior to discharge.      This chart was likely completed using voice recognition technology and may contain unintended grammatical , phraseology,and/or punctuation errors      Karo Hernandez MD

## 2020-11-23 NOTE — DISCHARGE INSTR - COC
Continuity of Care Form    Patient Name: Lynn Valero   :  1935  MRN:  [id number]    Admit date:  2020  Discharge date:  20    Code Status Order: DNR-CCA   Advance Directives:      Admitting Physician:  Felipe Dao MD  PCP: Luis Antonio Long MD    Discharging Nurse: [facility] Unit/Room#: 2677/6342-37  Discharging Unit Phone Number: [phone]    Emergency Contact:   Extended Emergency Contact Information  Primary Emergency Contact: Gloria Bean  Address: [address] [place] [address], Luige Lonnie [address] Phone: [phone]  Work Phone: [phone]  Relation: Child  Secondary Emergency Contact: Cleveland Coles [facility])  Address: Sydney Handler [address] [place] [address], Luige Lonnie [address] Phone: [phone]  Relation: Child    Past Surgical History:  Past Surgical History:   Procedure Laterality Date    CHOLECYSTECTOMY      GASTRIC BYPASS SURGERY      HERNIA REPAIR      HYSTERECTOMY         Immunization History:   Immunization History   Administered Date(s) Administered    Influenza Virus Vaccine 2015    Influenza, High Dose (Fluzone 65 yrs and older) 2016    Pneumococcal Polysaccharide ([id number]) 2012       Active Problems:  Patient Active Problem List   Diagnosis Code    DM (diabetes mellitus) type II, controlled, with peripheral vascular disorder ([facility] [address] E11.51    COPD (chronic obstructive pulmonary disease) ([facility] [address] J44.9    HTN (hypertension) I10    Sepsis ([address] A41.9    UTI (urinary tract infection) due to urinary indwelling catheter ([facility] [address] T83.511A, N39.0    Acute worsening of stage 3 chronic kidney disease N18.30    Clostridium difficile colitis A04.72    DVT (deep venous thrombosis) ([address] I82.409    Decubitus skin ulcer L89.90    Chronic renal insufficiency, stage III (moderate) N18.30    SVT (supraventricular tachycardia) (HCC) I47.1    Non-ST elevation MI (NSTEMI) ([facility] [address] I21.4    ESRD Dependent  Toileting  Dependent  Feeding  Assisted  Med Admin  Assisted  Med Delivery   crushed    Wound Care Documentation and Therapy:  Pressure Ulcer 07/14/15 Leg Upper;Posterior;Right Stage II distal to gluteal fold (Active)   Number of days: 1959       Pressure Ulcer 07/14/15 Leg Upper; Inner;Left left inner thigh. Stage II (Active)   Number of days: 1959       Pressure Ulcer 07/14/15 Sacrum Mid Recently Healed Pressure Ulcer (Active)   Number of days: 1959       Pressure Ulcer 08/03/15 Heel Left Suspected DTI (Active)   Number of days: 1939       Pressure Ulcer 08/03/15 Heel Right Unstageable - intact eschar (suspect a SDTI in origin) (Active)   Number of days: 1939       Pressure Ulcer 08/07/15 Face Device-related pressure ulcer - below lower lip/left (Active)   Number of days: 1935       Diabetic Ulcer 08/03/15 Toe (Comment which one) Left Great toe - intact eschar (Active)   Number of days: 1939       Diabetic Ulcer 08/03/15 Toe (Comment which one) Right Great toe - intact eschar (Active)   Number of days: 1939       Diabetic Ulcer 08/03/15 Foot Right;Lateral;Plantar Intact eschar - 2 ulcers. (Active)   Number of days: 1939        Elimination:  Continence: Bowel: No  Bladder: No  Urinary Catheter: None   Colostomy/Ileostomy/Ileal Conduit: No       Date of Last BM: 11/23    Intake/Output Summary (Last 24 hours) at 11/23/2020 1259  Last data filed at 11/23/2020 1235  Gross per 24 hour   Intake 660 ml   Output 1800 ml   Net -1140 ml     I/O last 3 completed shifts: In: 26 [P.O.:460; I.V.:10]  Out: 0     Safety Concerns: At Risk for Falls    Impairments/Disabilities:      Hearing    Nutrition Therapy:  Current Nutrition Therapy:   - Oral Diet:  Dysphagia 2 mechanically altered and Cardiac    Routes of Feeding: Oral  Liquids:  Thin Liquids  Daily Fluid Restriction: no  Last Modified Barium Swallow with Video (Video Swallowing Test): not done    Treatments at the Time of Hospital Discharge:   Respiratory Treatments: inhalers as ordered  Oxygen Therapy:  is not on home oxygen therapy. Ventilator:    - No ventilator support    Rehab Therapies: Physical Therapy and Occupational Therapy  Weight Bearing Status/Restrictions: No weight bearing restirctions  Other Medical Equipment (for information only, NOT a DME order):  wheelchair  Other Treatments: ***    Patient's personal belongings (please select all that are sent with patient):  None    RN SIGNATURE:  Electronically signed by Rick Aguilar RN on 11/23/20 at 1:34 PM EST    CASE MANAGEMENT/SOCIAL WORK SECTION    Inpatient Status Date: ***    Readmission Risk Assessment Score:  Readmission Risk              Risk of Unplanned Readmission:        17           Discharging to Facility/ Agency   Name: [facility]  Address:  [facility]:[phone]  KUT:[phone]    Dialysis Facility (if applicable)   Name:@ [facility]  Address:  Dialysis Schedule:  Phone:  Fax:    / signature: Electronically signed by CLYDE Sanders on 11/24/20 at 2:42 PM EST    PHYSICIAN SECTION    Prognosis: Fair    Condition at Discharge: Stable    Rehab Potential (if transferring to Rehab): Fair    Recommended Labs or Other Treatments After Discharge: PT/OT/RN consult    Physician Certification: I certify the above information and transfer of Luisa Byrne  is necessary for the continuing treatment of the diagnosis listed and that she requires [facility] for greater 30 days.      Update Admission H&P: No change in H&P    PHYSICIAN SIGNATURE:  Electronically signed by Joseph Alba MD on 11/23/20 at 12:59 PM EST

## 2020-11-23 NOTE — PROGRESS NOTES
sedentary, minimal walking = 2    Respiratory Pattern: Regular Pattern; RR 8-20 = 0    Breath Sounds: Diminished unilaterally = 1    Sputum  Sputum Color: None, Tenacity: None, Sputum How Obtained: Spontaneous cough  Cough: Strong, spontaneous, non-productive = 0    Vital Signs   BP (!) 138/58   Pulse 64   Temp 98.2 °F (36.8 °C) (Oral)   Resp 18   Ht 5' 1\" (1.549 m)   Wt 106 lb 11.2 oz (48.4 kg)   LMP  (LMP Unknown)   SpO2 96%   BMI 20.16 kg/m²   SPO2 (COPD values may differ): 90-91% on room air or greater than 92% on FiO2 24- 28% = 1    Peak Flow (asthma only): not applicable = 0    RSI: 5-6 = Q4hr PRN (every four hours as needed) for dyspnea        Plan       Goals: medication delivery, mobilize retained secretions, volume expansion and improve oxygenation    Patient/caregiver was educated on the proper method of use for Respiratory Care Devices:  Yes      Level of patient/caregiver understanding able to:   ? Verbalize understanding   ? Demonstrate understanding       ? Teach back        ? Needs reinforcement       ? No available caregiver               ? Other:     Response to education:  Good     Is patient being placed on Home Treatment Regimen? Yes     Does the patient have everything they need prior to discharge? NA     Comments: PT ON HOME THERAPY SUBSTITUTIONS      Plan of Care: decreaser DOUNEB TO PRN CONTINUE [facility]    Electronically signed by Miranda Walls RCP on 11/23/2020 at 11:41 AM    Respiratory Protocol Guidelines     1. Assessment and treatment by Respiratory Therapy will be initiated for medication and therapeutic interventions upon initiation of aerosolized medication. 2. Physician will be contacted for respiratory rate (RR) greater than 35 breaths per minute. Therapy will be held for heart rate (HR) greater than 140 beats per minute, pending direction from physician.   3. Bronchodilators will be administered via Metered Dose Inhaler (MDI) with spacer when the following criteria are met:  a. Alert and cooperative     b. HR < 140 bpm  c. RR < 30 bpm                d. Can demonstrate a 2-3 second inspiratory hold  4. Bronchodilators will be administered via Hand Held Nebulizer [facility]) to patients when ANY of the following criteria are met  a. Incognizant or uncooperative          b. Patients treated with HHN at Home        c. Unable to demonstrate proper use of MDI with spacer     d. RR > 30 bpm   5. Bronchodilators will be delivered via Metered Dose Inhaler (MDI), HHN, Aerogen to intubated patients on mechanical ventilation. 6. Inhalation medication orders will be delivered and/or substituted as outlined below. Aerosolized Medications Ordering and Administration Guidelines:    1. All Medications will be ordered by a physician, and their frequency and/or modality will be adjusted as defined by the patients Respiratory Severity Index (RSI) score. 2. If the patient does not have documented COPD, consider discontinuing anticholinergics when RSI is less than 9.  3. If the bronchospasm worsens (increased RSI), then the bronchodilator frequency can be increased to a maximum of every 4 hours. If greater than every 4 hours is required, the physician will be contacted. 4. If the bronchospasm improves, the frequency of the bronchodilator can be decreased, based on the patient's RSI, but not less than home treatment regimen frequency. 5. Bronchodilator(s) will be discontinued if patient has a RSI less than 9 and has received no scheduled or as needed treatment for 72  Hrs. Patients Ordered on a Mucolytic Agent:    1. Must always be administered with a bronchodilator. 2. Discontinue if patient experiences worsened bronchospasm, or secretions have lessened to the point that the patient is able to clear them with a cough. Anti-inflammatory and Combination Medications:    1.  If the patient lacks prior history of lung disease, is not using inhaled anti-inflammatory medication at home, and lacks wheezing by examination or by history for at least 24 hours, contact physician for possible discontinuation.

## 2020-11-23 NOTE — PLAN OF CARE
Spoke to [facility] regarding discharge planning. She wanted therapy to see patient prior to discharge. They will see this afternoon.

## 2020-11-23 NOTE — PROGRESS NOTES
Patient Name: Deanna Woods                                                    Primary Physician: Christo Jack MD  Admitting Dx: COPD exacerbation [facility]) [J44.1]  COPD exacerbation [facility]) [J44.1]    Nephrology Hospital Progress Note  [facility] Nephrology  [email or web address] [employer]                                       Interval Plan:     · Weight is 49 kg  · replace potassium for hypokalemia. · PO4 3.8 without binders but not eating much. · Dialysis in progress today. Orders were reviewed with the nurse. Assessment:     ESRD  - does not appear volume overloaded  - has functioning right arm graft  - K and BUN are high.   - daily RFP     Acidemia  - primarily respiratory  - there is some AGMA. Lactate was normal     Anemia  - is 11  - no need for EPO at this time     Secondary hyperparathyroidism  - continue calcitriol    Please call our office at [phone] or Perfect Serve with any questions or contact me directly. Subjective:     CC / Reason for Consult:  ESRD    HPI/PMH:  82 y.o. female presented to the hospital on 11/19/2020 with AMS. Was at outpatient dialysis. During dialysis she had altered status and hypotension with a systolic in the 66E and was becoming more somnolent and sent to the [address] [place] [address] ED. Patient hypoxic and hypercapnic. Started on Bipap. No gross volume overload on CXR.        ROS / Interval Hx: Patient seen in ICU and resting in bed. AMS ad minimal verbal response    Objective:        Gen: chronically ill appearing, not responsive to questions     Neck:  supple, no significant adenopathy     Cardio: normal rate, regular rhythm, normal S1, S2, no murmurs, rubs, clicks or gallops      Resp: clear to auscultation, no wheezes, rales or rhonchi, symmetric air entry. GI:  soft, nontender, nondistended, no masses or organomegaly.       Ext:  peripheral pulses normal, no pedal edema, no clubbing or cyanosis      MS: no joint tenderness, deformity or swelling      DERM: normal coloration and turgor, no rashesor bruising    Vitals:     /64   Pulse 60   Temp 97.6 °F (36.4 °C)   Resp 16   Ht 5' 1\" (1.549 m)   Wt 109 lb 12.6 oz (49.8 kg)   LMP  (LMP Unknown)   SpO2 98%   BMI 20.74 kg/m²      I/Os: Reviewed    Meds: Reviewed. Please see plan for changes made.     Labs:    Lab Results   Component Value Date    WBC 4.8 11/23/2020    HGB 9.7 11/23/2020    HCT 31.9 11/23/2020    MCV 96.4 11/23/2020     11/23/2020      Lab Results   Component Value Date    CREATININE 4.8 11/23/2020    BUN 37 11/23/2020     11/23/2020    K 3.3 11/23/2020    K 3.8 11/20/2020    CL 97 11/23/2020    CO2 24 11/23/2020     No components found for: GLU   Lab Results   Component Value Date    .8 03/16/2017    CALCIUM 8.6 11/23/2020    CAION 1.07 03/14/2017    PHOS 4.9 11/23/2020      No results found for: [id number]   Lab Results   Component Value Date    IRON 62 05/15/2015    TIBC 135 05/15/2015    FERRITIN 537 05/15/2015      No results found for: Espinoza Has

## 2020-11-23 NOTE — CARE COORDINATION
Case Management Assessment           Daily Note                 Date/ Time of Note: 11/23/2020 11:57 AM         Note completed by: Live Obrine    Patient Name: Myra Lee  YOB: 1935    Diagnosis:COPD exacerbation ([facility] [address] [J44.1]  COPD exacerbation ([facility] [address] [J44.1]  Patient Admission Status: Inpatient    Date of Admission:11/19/2020  2:37 PM Length of Stay: 4 GLOS:      Current Plan of Care: DC home today  ________________________________________________________________________________________  PT AM-PAC:   / 24 per last evaluation on:    OT AM-PAC:   / 24 per last evaluation on:    DME Needs for discharge: none  ________________________________________________________________________________________  Discharge Plan: return home with family    Tentative discharge date: later today    Current barriers to discharge: none    Referrals completed:     Resources/ information provided:   ________________________________________________________________________________________  Case Management Notes:  Pt to be DC home with family later today. Pt has Home 02 with [address] and will continue HD at [facility] on TTS. No orders placed for skilled Home care. 1624-Addendum  SW spoke with Pt's dgt, Quin Queen, this afternoon regarding DC plans - she requested to speak with MD and have PT/OT work with Pt prior to DC. JOHN sent a PerfectServe to Dr. Erin Shah to call her which he did. PT/OT were able to do their evals this afternoon. JOHN called Quin Queen back after evals were done and discussed evals with her. Quin Queen now stated that Pt will need to go to a SNF and prefers [facility] as Pt has been there before and can do her HD there. Pt has a COVID negative on 11/19/20. Due to the Pandemic, Pt's [employer] Medicare Precert for SNF will be waived at this time. JOHN called and spoke with Deja/Jc at [facility] and made referral - she will review Pt info in Epic and call SW back.  Deja did state that she does have an available HD chair at this time at [facility]. SW sent a PerfectServe to Dr. Jeff Leal and advised of above DC plan as well as staff RN. Coco and her family were provided with choice of provider; she and her family are in agreement with the discharge plan.     Care Transition Patient: Alyssa Bernard [place], [place]  [address]  Case Management Department  Ph: [phone]

## 2020-11-23 NOTE — DISCHARGE SUMMARY
Hospital Medicine Discharge Summary    Patient ID: Nehal Nurse      Patient's PCP: Mack Boas, MD    Admit Date: 11/19/2020     Discharge Date:   11/24/2020    Admitting Physician: Jossie Redman MD     Discharge Physician: Catia Laurent MD     Discharge Diagnoses: Active Hospital Problems    Diagnosis Date Noted    COPD exacerbation ([facility] [address] [J44.1] 11/19/2020       The patient was seen and examined on day of discharge and this discharge summary is in conjunction with any daily progress note from day of discharge. Hospital Course: Patient is 57-year-old female with history of COPD, renal sufficiency, CAD, MELISSA on BiPAP presented with confusion and fatigue. Patient was initially sent to [facility] ED. Was told to go to outpatient dialysis during dialysis patient was noted to be altered mental status and hypotensive. In ER patient was noted to be hypotensive, hypercapnic respiratory failure requiring BiPAP. Patient was treated for possible COPD exacerbation but the steroids antibiotics were stopped after COPD was ruled out. Seen and examined on day of discharge. Patient denies any nausea, vomiting or fever, chills, shortness of breath. Medically stable to be discharged home. #Acute on chronic hypoxic hypercapnic respiratory failure  Patient is on nocturnal BiPAP. Discussed with daughter patient is to follow-up with pulmonary. #Acute metabolic encephalopathy secondary to hypercapnia resolved. #Hypertension stable. #Adrenal insufficiency  #Elevated troponin in setting of ESRD  #ESRD on HD  #Alzheimer's disease  #MELISSA on nocturnal BiPAP. Physical Exam Performed:     BP (!) 148/74   Pulse 68   Temp 97.4 °F (36.3 °C) (Oral)   Resp 18   Ht 5' 1\" (1.549 m)   Wt 106 lb 11.2 oz (48.4 kg)   LMP  (LMP Unknown)   SpO2 96%   BMI 20.16 kg/m²       General appearance:  No apparent distress, appears stated age and cooperative.   HEENT:  Normal cephalic, atraumatic without obvious deformity. Pupils equal, round, and reactive to light. Extra ocular muscles intact. Conjunctivae/corneas clear. Neck: Supple, with full range of motion. No jugular venous distention. Trachea midline. Respiratory:  Normal respiratory effort. Clear to auscultation, bilaterally without Rales/Wheezes/Rhonchi. Cardiovascular:  Regular rate and rhythm with normal S1/S2 without murmurs, rubs or gallops. Abdomen: Soft, non-tender, non-distended with normal bowel sounds. Musculoskeletal:  No clubbing, cyanosis or edema bilaterally. Full range of motion without deformity. Skin: Skin color, texture, turgor normal.  No rashes or lesions. Neurologic:  Neurovascularly intact without any focal sensory/motor deficits. Cranial nerves: II-XII intact, grossly non-focal.  Psychiatric:  Alert and oriented, thought content appropriate, normal insight  Capillary Refill: Brisk,< 3 seconds   Peripheral Pulses: +2 palpable, equal bilaterally       Labs: For convenience and continuity at follow-up the following most recent labs are provided:      CBC:    Lab Results   Component Value Date    WBC 5.8 11/24/2020    HGB 9.8 11/24/2020    HCT 31.8 11/24/2020    PLT 96 11/24/2020       Renal:    Lab Results   Component Value Date     11/24/2020    K 2.9 11/24/2020    K 3.8 11/20/2020     11/24/2020    CO2 21 11/24/2020    BUN 20 11/24/2020    CREATININE 3.5 11/24/2020    CALCIUM 8.2 11/24/2020    PHOS 2.9 11/24/2020         Significant Diagnostic Studies    Radiology:   CT HEAD WO CONTRAST   Final Result      No acute intracranial abnormality or significant mass effect.       XR CHEST PORTABLE   Final Result      Expiratory lungs with no gross evidence for acute cardiopulmonary disease             Consults:     IP CONSULT TO NEPHROLOGY  IP CONSULT TO HOSPITALIST  IP CONSULT TO CRITICAL CARE  IP CONSULT TO HOME CARE NEEDS    Disposition: Nursing home [address] [address]    Condition at Discharge: Stable    Discharge Instructions/Follow-up: PCP, nephrology, pulmonary    Code Status:  DNR-CCA     Activity: activity as tolerated    Diet: renal diet      Discharge Medications:     Current Discharge Medication List           Details   loperamide (IMODIUM) 2 MG capsule Take 2 mg by mouth 3 times daily      donepezil (ARICEPT) 10 MG tablet Take 10 mg by mouth nightly      meloxicam (MOBIC) 7.5 MG tablet Take 7.5 mg by mouth daily      dronabinol (MARINOL) 5 MG capsule Take 5 mg by mouth 2 times daily (before meals). .      fludrocortisone (FLORINEF) 0.1 MG tablet Take 0.1 mg by mouth 2 times daily      potassium chloride (KLOR-CON M) 20 MEQ extended release tablet Take 20 mEq by mouth four times a week Take on Non-dialysis days (which is Sun-Mon-Wed-Fri)      methylcellulose (CITRUCEL) oral powder Take 2 g by mouth 2 times daily Take by mouth daily. calcitRIOL (ROCALTROL) 0.25 MCG capsule Take 1 capsule by mouth every other day  Qty: 30 capsule, Refills: 3      budesonide-formoterol (SYMBICORT) 160-4.5 MCG/ACT AERO Inhale 1 puff into the lungs 2 times daily      atorvastatin (LIPITOR) 10 MG tablet Take 10 mg by mouth nightly. levothyroxine (SYNTHROID) 100 MCG tablet Take 100 mcg by mouth Daily. citalopram (CELEXA) 10 MG tablet Take 10 mg by mouth nightly              Time Spent on discharge is more than 45 minutes in the examination, evaluation, counseling and review of medications and discharge plan. Signed:    Catia Laurent MD   11/24/2020     This chart was likely completed using voice recognition technology and may contain unintended grammatical , phraseology,and/or punctuation errors        Thank you Mack Boas, MD for the opportunity to be involved in this patient's care. If you have any questions or concerns please feel free to contact me at [phone].

## 2020-11-23 NOTE — PROGRESS NOTES
Occupational Therapy   Occupational Therapy Initial Assessment and Treatment Note   Date: 2020   Patient Name: Pasha Coley  MRN: [id number]     : 1935    Date of Service: 2020    Discharge Recommendations:Coco Quach scored a 10/24 on the AM-PAC ADL Inpatient form. Current research shows that an AM-PAC score of 18 or greater is typically associated with a discharge to the patient's home setting. Based on the patient's AM-PAC score, and their current ADL deficits, it is recommended that the patient have 2-3 sessions per week of Occupational Therapy at d/c to increase the patient's independence. At this time, this patient demonstrates the endurance and safety to discharge home with [facility] and a follow up treatment frequency of 2-3x/wk. Please see assessment section for further patient specific details. If patient discharges prior to next session this note will serve as a discharge summary. Please see below for the latest assessment towards goals. OT Equipment Recommendations  Equipment Needed: No    Assessment   Performance deficits / Impairments: Decreased functional mobility ; Decreased ADL status  Assessment: Pt from home with family. Pt unable to provide accurate PLOF. Needs Max A with functional transfers and Dependent with LE ADLs, incont of stool. Pt demo difficulty following simple commands. Despite low AMPAC score. Pt appears to be close to stated baseline and likely needs assist with all ADLs / transfers. w/c dependent at home. Pt would benefit from trial OT at d/c to maximize functional level.   Treatment Diagnosis: impaired ADLs/ cognition/ endurance 2/2 COPD exacerbation  Prognosis: Poor  Decision Making: Medium Complexity  OT Education: OT Role;Plan of Care  Patient Education: no learning - reteach if indicated  REQUIRES OT FOLLOW UP: Yes(trial)  Activity Tolerance  Activity Tolerance: Patient limited by fatigue  Activity Tolerance: pt frail and fatigues easily  Safety Devices  Safety Devices in place: Yes  Type of devices: Call light within reach; Left in chair;Nurse notified(no alarm in room _ RN aware)           Patient Diagnosis(es): The primary encounter diagnosis was Acute respiratory failure with hypoxia and hypercapnia ([address] Diagnoses of Altered mental status, unspecified altered mental status type and Pneumonia due to organism were also pertinent to this visit. has a past medical history of Abscess, Abscess, Anemia, Back pain, Chronic kidney disease, Clostridium difficile infection, COPD (chronic obstructive pulmonary disease) ([address] Dementia ([address] Depression, DM2 (diabetes mellitus, type 2) ([address] ESRD (end stage renal disease) ([address] Hypertension, Hypothyroidism, Obesity hypoventilation syndrome ([address] MELISSA (obstructive sleep apnea), Other activity(E029.9), Oxygen dependent, Parkinsonism ([address] Pseudotumor cerebri, Restrictive lung disease, and Spinal stenosis. has a past surgical history that includes Hysterectomy; Gastric bypass surgery; Cholecystectomy; and hernia repair. Treatment Diagnosis: impaired ADLs/ cognition/ endurance 2/2 COPD exacerbation      Restrictions  Position Activity Restriction  Other position/activity restrictions: Up with assist    Subjective   General  Chart Reviewed: Yes  Additional Pertinent Hx: Pt to ED 11/19 with altered mental status and hypotension from dialysis. Pt admitted to ICU for COPD exacerbation on Bipap. Head CT (-). CXR (-). COVID (-). PMH:  HTN, DM, COPD, depression, dementia, spinal stenosis, O2 dependent  Family / Caregiver Present: No  Diagnosis: COPD exacerbation  Subjective  Subjective: \" Yes\" pt in bed alert and answering yes to 90% of questions. Pt demo poor ability to follow simple commands. Dementia at baseline.   unable to determine PLOF  Patient Currently in Pain: Denies  Vital Signs  Patient Currently in Pain: Denies    Social/Functional History  Social/Functional History  Additional Comments: Pt is a poor historian and unable to provide history. Pt reports son assists at home. Objective Treatment included functional transfer training, ADL's and pt. education. Orientation  Overall Orientation Status: Impaired  Orientation Level: Oriented to person;Oriented to place; Disoriented to time;Disoriented to situation     Balance  Sitting Balance: Contact guard assistance(improved to Supervision)  Standing Balance: Moderate assistance(1-2 with chair for support / balance)  Standing Balance  Time: 2 mins x 2 and 1 mins x 2  Activity: functional transfers /stance at chair / functional mobility attempts  Functional Mobility  Functional Mobility Comments: unable to ambulate  Toilet Transfers  Toilet - Technique: Stand pivot  Equipment Used: Standard bedside commode  Toilet Transfer: Maximum assistance  Toilet Transfers Comments: incont of stool  ADL  Feeding: Setup; Increased time to complete;Verbal cueing  Grooming: Minimal assistance;Verbal cueing;Setup  LE Dressing: Dependent/Total  Toileting: Dependent/Total(incont of stool)        Bed mobility  Supine to Sit: Maximum assistance(HOB raised, max verbal cues to reach for rail)  Transfers  Sit to stand: 2 Person assistance; Moderate assistance  Stand to sit: Moderate assistance;2 Person assistance  Transfer Comments: from chair , Max A x 1 from bed. Vision - Basic Assessment  Prior Vision: No visual deficits  Cognition  Overall Cognitive Status: Exceptions  Arousal/Alertness: Delayed responses to stimuli  Following Commands: Follows one step commands with repetition; Inconsistently follows commands  Attention Span: Attends with cues to redirect  Memory: Decreased short term memory;Decreased recall of recent events;Decreased recall of precautions  Safety Judgement: Decreased awareness of need for safety  Insights: Not aware of deficits  Initiation: Requires cues for all  Sequencing: Requires cues for all  Cognition Comment: dementia at baseline    LUE AROM (degrees)  LUE AROM : WFL  LUE General AROM: limited at end range shoulder flexion poor ability to follow directions  Left Hand AROM (degrees)  Left Hand AROM: WFL  RUE AROM (degrees)  RUE AROM : WFL  RUE General AROM: limited at end range shoulder flexion poor ability to follow directions  Right Hand AROM (degrees)  Right Hand AROM: WFL     Plan   Plan  Times per week: 2-5x  Times per day: Daily  Current Treatment Recommendations: Self-Care / ADL, Safety Education & Training, Functional Mobility Training, Patient/Caregiver Education & Training, Cognitive Reorientation    AM-PAC Score  AM-PAC Inpatient Daily Activity Raw Score: 10 (11/23/20 1516)  AM-PAC Inpatient ADL T-Scale Score : 27.31 (11/23/20 1516)  ADL Inpatient CMS 0-100% Score: 74.7 (11/23/20 1516)  ADL Inpatient CMS G-Code Modifier : CL (11/23/20 1516)    Goals  Short term goals  Time Frame for Short term goals: at d/c  Short term goal 1: commode/ BSC  transfers with Mod A  Short term goal 2: Grooming with setup with Min v.cues  Short term goal 3: Follow 1 step directives 75% of time  Short term goal 4: Stance x 3 mins with CGA for pericare / hygiene  Patient Goals   Patient goals : Not stated     Therapy Time   Individual Concurrent Group Co-treatment   Time In 1430         Time Out 1515         Minutes 45              Timed Code Treatment Minutes:   24 mins    Total Treatment Minutes:  45 mins       Ralph Echevarria OT

## 2020-11-23 NOTE — PROGRESS NOTES
Physical Therapy    Facility/Department: [address]  Initial Assessment and Treatment    NAME: Luis Melgar  : 1935  MRN: [id number]    Date of Service: 2020    Discharge Recommendations:  Luis Melgar scored a 9/24 on the AM-PAC short mobility form. Current research shows that an AM-PAC score of 18 or greater is typically associated with a discharge to the patient's home setting. Based on the patient's AM-PAC score and their current functional mobility deficits, it is recommended that the patient have 2-3 sessions per week of Physical Therapy at d/c to increase the patient's independence. At this time, this patient demonstrates the endurance and safety to discharge home. Please see assessment section for further patient specific details. PT Equipment Recommendations  Equipment Needed: No    Assessment   Assessment: Pt from home with COPD exacerbation. Per chart, pt lives at home with assist from family. Feel pt is likely close to functional baseline, but unsure due to poor historian and pt unable to provide social history. Pt is limited in skilled PT by baseline cognition. Pt has difficulty following commands and needs increased time for processing. Pt requires Max A for stand pivot transfers and unable to ambulate at this time. If home, recommend 24hr care, assist for transfers and w/c for mobility. If family is unable to provide needed care, consider short SNF vs LTC options. Treatment Diagnosis: Decreased mobility due to COPD exacerbation. Decision Making: High Complexity  Patient Education: Role of PT. No learning noted due to cognition. REQUIRES PT FOLLOW UP: Yes         Restrictions  Up with assist     Vision/Hearing  Vision: Within Functional Limits  Hearing: Within functional limits       Subjective  Chart Reviewed: Yes  Additional Pertinent Hx: Pt to ED  with altered mental status and hypotension from dialysis.   Pt admitted to ICU for COPD exacerbation on term goal 1: supine <> sit Min A  Short term goal 2: sit <> stand Min A  Short term goal 3: stand pivot transfers Min A  Patient Goals   Patient goals : Unable to state       Therapy Time   Individual Concurrent Group Co-treatment   Time In 1434         Time Out 1515         Minutes 41         Timed Code Treatment Minutes:  25  Total Treatment Minutes:  39      Zoila Purcell, PT

## 2020-11-23 NOTE — PROGRESS NOTES
Physical Therapy and Occupational Therapy  No Treatment    Referral received and chart reviewed. Pt currently off floor at dialysis. Will try back later today as time and schedule allow. If not, will follow up 11/24. RN aware.     Tico Lombardi Edwards OTR/L  0108

## 2020-11-24 VITALS
SYSTOLIC BLOOD PRESSURE: 148 MMHG | RESPIRATION RATE: 18 BRPM | DIASTOLIC BLOOD PRESSURE: 74 MMHG | OXYGEN SATURATION: 96 % | BODY MASS INDEX: 20.15 KG/M2 | HEIGHT: 61 IN | HEART RATE: 68 BPM | TEMPERATURE: 97.4 F | WEIGHT: 106.7 LBS

## 2020-11-24 LAB
ALBUMIN SERPL-MCNC: 3.3 G/DL (ref 3.4–5)
ANION GAP SERPL CALCULATED.3IONS-SCNC: 12 MMOL/L (ref 3–16)
BASOPHILS ABSOLUTE: 0.1 K/UL (ref 0–0.2)
BASOPHILS RELATIVE PERCENT: 1 %
BUN BLDV-MCNC: 20 MG/DL (ref 7–20)
CALCIUM SERPL-MCNC: 8.2 MG/DL (ref 8.3–10.6)
CHLORIDE BLD-SCNC: 104 MMOL/L (ref 99–110)
CO2: 21 MMOL/L (ref 21–32)
CREAT SERPL-MCNC: 3.5 MG/DL (ref 0.6–1.2)
EOSINOPHILS ABSOLUTE: 0 K/UL (ref 0–0.6)
EOSINOPHILS RELATIVE PERCENT: 0.8 %
GFR AFRICAN AMERICAN: 15
GFR NON-AFRICAN AMERICAN: 12
GLUCOSE BLD-MCNC: 101 MG/DL (ref 70–99)
GLUCOSE BLD-MCNC: 110 MG/DL (ref 70–99)
GLUCOSE BLD-MCNC: 93 MG/DL (ref 70–99)
GLUCOSE BLD-MCNC: 98 MG/DL (ref 70–99)
HCT VFR BLD CALC: 31.8 % (ref 36–48)
HEMOGLOBIN: 9.8 G/DL (ref 12–16)
HEPATITIS B SURFACE ANTIGEN INTERPRETATION: NORMAL
LYMPHOCYTES ABSOLUTE: 1.1 K/UL (ref 1–5.1)
LYMPHOCYTES RELATIVE PERCENT: 19.4 %
MCH RBC QN AUTO: 29.4 PG (ref 26–34)
MCHC RBC AUTO-ENTMCNC: 30.8 G/DL (ref 31–36)
MCV RBC AUTO: 95.4 FL (ref 80–100)
MONOCYTES ABSOLUTE: 1 K/UL (ref 0–1.3)
MONOCYTES RELATIVE PERCENT: 18.1 %
NEUTROPHILS ABSOLUTE: 3.5 K/UL (ref 1.7–7.7)
NEUTROPHILS RELATIVE PERCENT: 60.7 %
PDW BLD-RTO: 16.2 % (ref 12.4–15.4)
PERFORMED ON: ABNORMAL
PERFORMED ON: ABNORMAL
PERFORMED ON: NORMAL
PHOSPHORUS: 2.9 MG/DL (ref 2.5–4.9)
PLATELET # BLD: 96 K/UL (ref 135–450)
PMV BLD AUTO: 8.9 FL (ref 5–10.5)
POTASSIUM SERPL-SCNC: 2.9 MMOL/L (ref 3.5–5.1)
RBC # BLD: 3.33 M/UL (ref 4–5.2)
SODIUM BLD-SCNC: 137 MMOL/L (ref 136–145)
WBC # BLD: 5.8 K/UL (ref 4–11)

## 2020-11-24 PROCEDURE — 6370000000 HC RX 637 (ALT 250 FOR IP): Performed by: INTERNAL MEDICINE

## 2020-11-24 PROCEDURE — 36415 COLL VENOUS BLD VENIPUNCTURE: CPT

## 2020-11-24 PROCEDURE — 6360000002 HC RX W HCPCS: Performed by: STUDENT IN AN ORGANIZED HEALTH CARE EDUCATION/TRAINING PROGRAM

## 2020-11-24 PROCEDURE — 94761 N-INVAS EAR/PLS OXIMETRY MLT: CPT

## 2020-11-24 PROCEDURE — 80069 RENAL FUNCTION PANEL: CPT

## 2020-11-24 PROCEDURE — 2580000003 HC RX 258: Performed by: STUDENT IN AN ORGANIZED HEALTH CARE EDUCATION/TRAINING PROGRAM

## 2020-11-24 PROCEDURE — 94664 DEMO&/EVAL PT USE INHALER: CPT

## 2020-11-24 PROCEDURE — 94640 AIRWAY INHALATION TREATMENT: CPT

## 2020-11-24 PROCEDURE — 6370000000 HC RX 637 (ALT 250 FOR IP): Performed by: STUDENT IN AN ORGANIZED HEALTH CARE EDUCATION/TRAINING PROGRAM

## 2020-11-24 PROCEDURE — 87340 HEPATITIS B SURFACE AG IA: CPT

## 2020-11-24 PROCEDURE — 85025 COMPLETE CBC W/AUTO DIFF WBC: CPT

## 2020-11-24 RX ORDER — POTASSIUM CHLORIDE 20 MEQ/1
40 TABLET, EXTENDED RELEASE ORAL ONCE
Status: COMPLETED | OUTPATIENT
Start: 2020-11-24 | End: 2020-11-24

## 2020-11-24 RX ADMIN — LOPERAMIDE HYDROCHLORIDE 2 MG: 2 CAPSULE ORAL at 01:18

## 2020-11-24 RX ADMIN — BUDESONIDE INHALATION 500 MCG: 0.5 SUSPENSION RESPIRATORY (INHALATION) at 08:43

## 2020-11-24 RX ADMIN — LEVOTHYROXINE SODIUM 100 MCG: 100 TABLET ORAL at 06:22

## 2020-11-24 RX ADMIN — Medication 10 ML: at 10:01

## 2020-11-24 RX ADMIN — ARFORMOTEROL TARTRATE 15 MCG: 15 SOLUTION RESPIRATORY (INHALATION) at 08:43

## 2020-11-24 RX ADMIN — FLUDROCORTISONE ACETATE 0.1 MG: 0.1 TABLET ORAL at 10:01

## 2020-11-24 RX ADMIN — HEPARIN SODIUM 5000 UNITS: 5000 INJECTION INTRAVENOUS; SUBCUTANEOUS at 13:27

## 2020-11-24 RX ADMIN — HEPARIN SODIUM 5000 UNITS: 5000 INJECTION INTRAVENOUS; SUBCUTANEOUS at 06:22

## 2020-11-24 RX ADMIN — CALCITRIOL CAPSULES 0.25 MCG 0.25 MCG: 0.25 CAPSULE ORAL at 10:01

## 2020-11-24 RX ADMIN — POTASSIUM CHLORIDE 40 MEQ: 1500 TABLET, EXTENDED RELEASE ORAL at 11:24

## 2020-11-24 ASSESSMENT — PAIN SCALES - GENERAL
PAINLEVEL_OUTOF10: 0

## 2020-11-24 NOTE — CARE COORDINATION
JOHN spoke with Deja/Admissions at [facility] this AM re: she has a bed available for Pt as well as a HD chair time but needs an updated Hep B surface antigen test done prior to admit as Pt does not have one that has been done in last 30 days. JOHN sent a [employer] to Dr. Naa Jacques who states he will order now. Pt has a COVID negative on 11/19/20. Pt's [employer] Medicare Precert is being waived due to Pandemic. JOHN completed and submitted HENS #[id number]. JOHN spoke with Pt's dgt, Olga Mcdonald, via phone this AM regarding above and tentative DC later today to [facility] if Hep B results are back - she is in agreement with DC plan. Luanne Peabody, [place]  Case Management  [phone]-Addendum  Deja/Admissions at [facility] just called stating she can accept Pt for admit this evening since Hep B is ordered. JOHN made a 6PM run with [facility] and faxed [address] to [facility]. Staff RN aware to call report to [phone]. JOHN called Pt's dgt and advised of above DC plan and she is in agreement.      Kelsy Torres, BARBW  Case Management

## 2020-11-24 NOTE — PROGRESS NOTES
Patient Name: Nehal Nurse                                                    Primary Physician: oJssie Redman MD  Admitting Dx: COPD exacerbation [facility]) [J44.1]  COPD exacerbation [facility]) [J44.1]    Nephrology Hospital Progress Note  [facility]  [email or web address]                                       Interval Plan:     · Weight is 48 kg  · Replace KCL  · Next hemodialysis is in a.m.  · Okay to discharge from renal perspective. She will go to SNF and will come back to [facility] for dialysis    Assessment:     ESRD  - does not appear volume overloaded  - has functioning right arm graft  - K and BUN are high.   - daily RFP     Acidemia  - primarily respiratory  - there is some AGMA. Lactate was normal     Anemia  - is 11  - no need for EPO at this time     Secondary hyperparathyroidism  - continue calcitriol    Please call our office at [phone] or [employer] with any questions or contact me directly. Subjective:     CC / Reason for Consult:  ESRD    HPI/PMH:  82 y.o. female presented to the hospital on 11/19/2020 with AMS. Was at outpatient dialysis. During dialysis she had altered status and hypotension with a systolic in the 52Q and was becoming more somnolent and sent to the [facility] ED. Patient hypoxic and hypercapnic. Started on Bipap. No gross volume overload on CXR.        ROS / Interval Hx: Patient seen in ICU and resting in bed. AMS ad minimal verbal response    Objective:        Gen: chronically ill appearing, not responsive to questions     Neck:  supple, no significant adenopathy     Cardio: normal rate, regular rhythm, normal S1, S2, no murmurs, rubs, clicks or gallops      Resp: clear to auscultation, no wheezes, rales or rhonchi, symmetric air entry. GI:  soft, nontender, nondistended, no masses or organomegaly.       Ext:  peripheral pulses normal, no pedal edema, no clubbing or cyanosis      MS: no joint tenderness, deformity or swelling      DERM: normal coloration and turgor, no rashesor bruising    Vitals:     BP (!) 144/68   Pulse 62   Temp 97.4 °F (36.3 °C) (Oral)   Resp 18   Ht 5' 1\" (1.549 m)   Wt 106 lb 11.2 oz (48.4 kg)   LMP  (LMP Unknown)   SpO2 97%   BMI 20.16 kg/m²      I/Os: Reviewed    Meds: Reviewed. Please see plan for changes made.     Labs:    Lab Results   Component Value Date    WBC 4.8 11/23/2020    HGB 9.7 11/23/2020    HCT 31.9 11/23/2020    MCV 96.4 11/23/2020     11/23/2020      Lab Results   Component Value Date    CREATININE 4.8 11/23/2020    BUN 37 11/23/2020     11/23/2020    K 3.3 11/23/2020    K 3.8 11/20/2020    CL 97 11/23/2020    CO2 24 11/23/2020     No components found for: GLU   Lab Results   Component Value Date    .8 03/16/2017    CALCIUM 8.6 11/23/2020    CAION 1.07 03/14/2017    PHOS 4.9 11/23/2020      No results found for: [id number]   Lab Results   Component Value Date    IRON 62 05/15/2015    TIBC 135 05/15/2015    FERRITIN 537 05/15/2015      No results found for: Elizabeth Perdomo

## 2020-11-24 NOTE — PLAN OF CARE
Problem: Falls - Risk of:  Goal: Will remain free from falls  Description: Will remain free from falls  11/24/2020 0240 by Chavez Driver RN  Outcome: Ongoing  Note: Patient at risk for falls. Patient resting quietly in bed. Side rails up x 3. Bed locked in lowest position. Bed alarm on. Bedside table and call light within reach. Pt ambulates with stand/pivots or angelique steady and non-skid socks. Patient instructed to call for assistance. Patient verbalized understanding. Will continue to monitor. Problem: Skin Integrity:  Goal: Absence of new skin breakdown  Description: Absence of new skin breakdown  11/24/2020 0240 by Chavez Driver RN  Outcome: Ongoing  Note: Pt has excoriated kourtney/rectum/coccyx area. Pt cleansed with barrier wipes and zinc paste applied. Pt is turned Q2 hrs with additional pillow support. Problem: Pain:  Goal: Pain level will decrease  Description: Pain level will decrease  Outcome: Ongoing  Note: Pt resting at this time. No complaints of pain. Encouraged patient to call out with any needs. Will continue to monitor.

## 2020-11-25 NOTE — PROGRESS NOTES
Patient discharging to facility. IV and telemetry removed. Discharging with personal belongings and AVS packet. Leaving the unit via stretcher with transportation company.

## 2021-06-01 NOTE — PLAN OF CARE
Problem: Pain:  Goal: Pain level will decrease  Description: Pain level will decrease  Outcome: Ongoing  Note: Pt resting at this time. No complaints of pain. Encouraged patient to call out with any needs. Will continue to monitor. Problem: Skin Integrity:  Goal: Will show no infection signs and symptoms  Description: Will show no infection signs and symptoms  Outcome: Ongoing  Note: Pt at risk for skin breakdown. Skin assessment as documented. Pts skin cleansed with inc cleanser as needed. Pt repositioned in bed with pillow support as needed. Call light within reach. Will continue to monitor. Problem: Falls - Risk of:  Goal: Will remain free from falls  Description: Will remain free from falls  11/23/2020 0243 by Haritha Cade RN  Outcome: Ongoing  Note: Patient at risk for falls. Patient resting quietly in bed. Side rails up x 3. Bed locked in lowest position. Bed alarm on. Bedside table and call light within reach. Patient instructed to call for assistance. No attempts to get out of bed have been made. Frequent rounds are being made to ensure Pt safety. Will continue to monitor. Cardiac